# Patient Record
Sex: FEMALE | Employment: OTHER | URBAN - METROPOLITAN AREA
[De-identification: names, ages, dates, MRNs, and addresses within clinical notes are randomized per-mention and may not be internally consistent; named-entity substitution may affect disease eponyms.]

---

## 2024-06-02 ENCOUNTER — APPOINTMENT (OUTPATIENT)
Dept: RADIOLOGY | Facility: MEDICAL CENTER | Age: 89
End: 2024-06-02
Payer: MEDICARE

## 2024-06-02 ENCOUNTER — HOSPITAL ENCOUNTER (INPATIENT)
Facility: MEDICAL CENTER | Age: 89
LOS: 5 days | DRG: 281 | End: 2024-06-07
Attending: STUDENT IN AN ORGANIZED HEALTH CARE EDUCATION/TRAINING PROGRAM | Admitting: STUDENT IN AN ORGANIZED HEALTH CARE EDUCATION/TRAINING PROGRAM
Payer: MEDICARE

## 2024-06-02 DIAGNOSIS — N28.89 RENAL MASS: ICD-10-CM

## 2024-06-02 DIAGNOSIS — E78.5 HYPERLIPIDEMIA, UNSPECIFIED HYPERLIPIDEMIA TYPE: ICD-10-CM

## 2024-06-02 DIAGNOSIS — I35.0 SEVERE AORTIC STENOSIS: ICD-10-CM

## 2024-06-02 DIAGNOSIS — I10 PRIMARY HYPERTENSION: ICD-10-CM

## 2024-06-02 PROBLEM — I21.4 NSTEMI (NON-ST ELEVATED MYOCARDIAL INFARCTION) (HCC): Status: ACTIVE | Noted: 2024-06-02

## 2024-06-02 LAB
ALBUMIN SERPL BCP-MCNC: 3.9 G/DL (ref 3.2–4.9)
ALBUMIN/GLOB SERPL: 1.2 G/DL
ALP SERPL-CCNC: 81 U/L (ref 30–99)
ALT SERPL-CCNC: 14 U/L (ref 2–50)
ANION GAP SERPL CALC-SCNC: 15 MMOL/L (ref 7–16)
APTT PPP: 27.5 SEC (ref 24.7–36)
AST SERPL-CCNC: 23 U/L (ref 12–45)
BASOPHILS # BLD AUTO: 0.4 % (ref 0–1.8)
BASOPHILS # BLD: 0.05 K/UL (ref 0–0.12)
BILIRUB SERPL-MCNC: 1 MG/DL (ref 0.1–1.5)
BUN SERPL-MCNC: 9 MG/DL (ref 8–22)
CALCIUM ALBUM COR SERPL-MCNC: 9.1 MG/DL (ref 8.5–10.5)
CALCIUM SERPL-MCNC: 9 MG/DL (ref 8.5–10.5)
CHLORIDE SERPL-SCNC: 103 MMOL/L (ref 96–112)
CO2 SERPL-SCNC: 21 MMOL/L (ref 20–33)
CREAT SERPL-MCNC: 0.6 MG/DL (ref 0.5–1.4)
EOSINOPHIL # BLD AUTO: 0.03 K/UL (ref 0–0.51)
EOSINOPHIL NFR BLD: 0.3 % (ref 0–6.9)
ERYTHROCYTE [DISTWIDTH] IN BLOOD BY AUTOMATED COUNT: 42 FL (ref 35.9–50)
GFR SERPLBLD CREATININE-BSD FMLA CKD-EPI: 87 ML/MIN/1.73 M 2
GLOBULIN SER CALC-MCNC: 3.2 G/DL (ref 1.9–3.5)
GLUCOSE SERPL-MCNC: 134 MG/DL (ref 65–99)
HCT VFR BLD AUTO: 42.3 % (ref 37–47)
HGB BLD-MCNC: 14 G/DL (ref 12–16)
IMM GRANULOCYTES # BLD AUTO: 0.06 K/UL (ref 0–0.11)
IMM GRANULOCYTES NFR BLD AUTO: 0.5 % (ref 0–0.9)
INR PPP: 1.02 (ref 0.87–1.13)
LYMPHOCYTES # BLD AUTO: 1.24 K/UL (ref 1–4.8)
LYMPHOCYTES NFR BLD: 10.7 % (ref 22–41)
MAGNESIUM SERPL-MCNC: 1.9 MG/DL (ref 1.5–2.5)
MCH RBC QN AUTO: 27.7 PG (ref 27–33)
MCHC RBC AUTO-ENTMCNC: 33.1 G/DL (ref 32.2–35.5)
MCV RBC AUTO: 83.6 FL (ref 81.4–97.8)
MONOCYTES # BLD AUTO: 0.83 K/UL (ref 0–0.85)
MONOCYTES NFR BLD AUTO: 7.2 % (ref 0–13.4)
NEUTROPHILS # BLD AUTO: 9.35 K/UL (ref 1.82–7.42)
NEUTROPHILS NFR BLD: 80.9 % (ref 44–72)
NRBC # BLD AUTO: 0 K/UL
NRBC BLD-RTO: 0 /100 WBC (ref 0–0.2)
NT-PROBNP SERPL IA-MCNC: 6032 PG/ML (ref 0–125)
PLATELET # BLD AUTO: 247 K/UL (ref 164–446)
PMV BLD AUTO: 9 FL (ref 9–12.9)
POTASSIUM SERPL-SCNC: 3.7 MMOL/L (ref 3.6–5.5)
PROT SERPL-MCNC: 7.1 G/DL (ref 6–8.2)
PROTHROMBIN TIME: 13.5 SEC (ref 12–14.6)
RBC # BLD AUTO: 5.06 M/UL (ref 4.2–5.4)
SODIUM SERPL-SCNC: 139 MMOL/L (ref 135–145)
TROPONIN T SERPL-MCNC: 128 NG/L (ref 6–19)
UFH PPP CHRO-ACNC: <0.1 IU/ML
WBC # BLD AUTO: 11.6 K/UL (ref 4.8–10.8)

## 2024-06-02 PROCEDURE — 36415 COLL VENOUS BLD VENIPUNCTURE: CPT

## 2024-06-02 PROCEDURE — 84484 ASSAY OF TROPONIN QUANT: CPT

## 2024-06-02 PROCEDURE — 770020 HCHG ROOM/CARE - TELE (206)

## 2024-06-02 PROCEDURE — 83880 ASSAY OF NATRIURETIC PEPTIDE: CPT

## 2024-06-02 PROCEDURE — 93005 ELECTROCARDIOGRAM TRACING: CPT | Performed by: STUDENT IN AN ORGANIZED HEALTH CARE EDUCATION/TRAINING PROGRAM

## 2024-06-02 PROCEDURE — A9270 NON-COVERED ITEM OR SERVICE: HCPCS | Performed by: STUDENT IN AN ORGANIZED HEALTH CARE EDUCATION/TRAINING PROGRAM

## 2024-06-02 PROCEDURE — 85730 THROMBOPLASTIN TIME PARTIAL: CPT

## 2024-06-02 PROCEDURE — 83735 ASSAY OF MAGNESIUM: CPT

## 2024-06-02 PROCEDURE — 85610 PROTHROMBIN TIME: CPT

## 2024-06-02 PROCEDURE — 700102 HCHG RX REV CODE 250 W/ 637 OVERRIDE(OP): Performed by: STUDENT IN AN ORGANIZED HEALTH CARE EDUCATION/TRAINING PROGRAM

## 2024-06-02 PROCEDURE — 85520 HEPARIN ASSAY: CPT

## 2024-06-02 PROCEDURE — 85025 COMPLETE CBC W/AUTO DIFF WBC: CPT

## 2024-06-02 PROCEDURE — 80053 COMPREHEN METABOLIC PANEL: CPT

## 2024-06-02 PROCEDURE — 99223 1ST HOSP IP/OBS HIGH 75: CPT | Performed by: STUDENT IN AN ORGANIZED HEALTH CARE EDUCATION/TRAINING PROGRAM

## 2024-06-02 PROCEDURE — 700111 HCHG RX REV CODE 636 W/ 250 OVERRIDE (IP): Performed by: STUDENT IN AN ORGANIZED HEALTH CARE EDUCATION/TRAINING PROGRAM

## 2024-06-02 RX ORDER — HEPARIN SODIUM 5000 [USP'U]/100ML
0-30 INJECTION, SOLUTION INTRAVENOUS CONTINUOUS
Status: DISCONTINUED | OUTPATIENT
Start: 2024-06-02 | End: 2024-06-03

## 2024-06-02 RX ORDER — ONDANSETRON 2 MG/ML
4 INJECTION INTRAMUSCULAR; INTRAVENOUS EVERY 4 HOURS PRN
Status: ACTIVE | OUTPATIENT
Start: 2024-06-02 | End: 2024-06-03

## 2024-06-02 RX ORDER — ACETAMINOPHEN 325 MG/1
650 TABLET ORAL EVERY 6 HOURS PRN
Status: ACTIVE | OUTPATIENT
Start: 2024-06-02 | End: 2024-06-03

## 2024-06-02 RX ORDER — RAMIPRIL 5 MG/1
5 CAPSULE ORAL
Status: DISCONTINUED | OUTPATIENT
Start: 2024-06-03 | End: 2024-06-07 | Stop reason: HOSPADM

## 2024-06-02 RX ORDER — ROSUVASTATIN CALCIUM 20 MG/1
20 TABLET, COATED ORAL EVERY EVENING
Status: DISCONTINUED | OUTPATIENT
Start: 2024-06-02 | End: 2024-06-07 | Stop reason: HOSPADM

## 2024-06-02 RX ORDER — LABETALOL HYDROCHLORIDE 5 MG/ML
10 INJECTION, SOLUTION INTRAVENOUS EVERY 4 HOURS PRN
Status: ACTIVE | OUTPATIENT
Start: 2024-06-02 | End: 2024-06-03

## 2024-06-02 RX ORDER — AMLODIPINE BESYLATE 10 MG/1
10 TABLET ORAL
Status: DISCONTINUED | OUTPATIENT
Start: 2024-06-03 | End: 2024-06-02

## 2024-06-02 RX ORDER — HEPARIN SODIUM 1000 [USP'U]/ML
2000 INJECTION, SOLUTION INTRAVENOUS; SUBCUTANEOUS PRN
Status: DISCONTINUED | OUTPATIENT
Start: 2024-06-02 | End: 2024-06-03

## 2024-06-02 RX ORDER — ATORVASTATIN CALCIUM 40 MG/1
40 TABLET, FILM COATED ORAL EVERY EVENING
Status: DISCONTINUED | OUTPATIENT
Start: 2024-06-02 | End: 2024-06-02

## 2024-06-02 RX ADMIN — HEPARIN SODIUM 2000 UNITS: 1000 INJECTION, SOLUTION INTRAVENOUS; SUBCUTANEOUS at 23:56

## 2024-06-02 RX ADMIN — ROSUVASTATIN CALCIUM 20 MG: 20 TABLET, FILM COATED ORAL at 22:56

## 2024-06-02 RX ADMIN — HEPARIN SODIUM 10 UNITS/KG/HR: 5000 INJECTION, SOLUTION INTRAVENOUS at 23:03

## 2024-06-02 SDOH — ECONOMIC STABILITY: TRANSPORTATION INSECURITY
IN THE PAST 12 MONTHS, HAS THE LACK OF TRANSPORTATION KEPT YOU FROM MEDICAL APPOINTMENTS OR FROM GETTING MEDICATIONS?: NO

## 2024-06-02 SDOH — ECONOMIC STABILITY: TRANSPORTATION INSECURITY
IN THE PAST 12 MONTHS, HAS LACK OF RELIABLE TRANSPORTATION KEPT YOU FROM MEDICAL APPOINTMENTS, MEETINGS, WORK OR FROM GETTING THINGS NEEDED FOR DAILY LIVING?: NO

## 2024-06-02 ASSESSMENT — PATIENT HEALTH QUESTIONNAIRE - PHQ9
SUM OF ALL RESPONSES TO PHQ9 QUESTIONS 1 AND 2: 0
1. LITTLE INTEREST OR PLEASURE IN DOING THINGS: NOT AT ALL
2. FEELING DOWN, DEPRESSED, IRRITABLE, OR HOPELESS: NOT AT ALL

## 2024-06-02 ASSESSMENT — SOCIAL DETERMINANTS OF HEALTH (SDOH)
WITHIN THE LAST YEAR, HAVE YOU BEEN AFRAID OF YOUR PARTNER OR EX-PARTNER?: NO
WITHIN THE PAST 12 MONTHS, THE FOOD YOU BOUGHT JUST DIDN'T LAST AND YOU DIDN'T HAVE MONEY TO GET MORE: NEVER TRUE
WITHIN THE LAST YEAR, HAVE TO BEEN RAPED OR FORCED TO HAVE ANY KIND OF SEXUAL ACTIVITY BY YOUR PARTNER OR EX-PARTNER?: NO
WITHIN THE LAST YEAR, HAVE YOU BEEN HUMILIATED OR EMOTIONALLY ABUSED IN OTHER WAYS BY YOUR PARTNER OR EX-PARTNER?: NO
WITHIN THE LAST YEAR, HAVE YOU BEEN KICKED, HIT, SLAPPED, OR OTHERWISE PHYSICALLY HURT BY YOUR PARTNER OR EX-PARTNER?: NO
WITHIN THE PAST 12 MONTHS, YOU WORRIED THAT YOUR FOOD WOULD RUN OUT BEFORE YOU GOT THE MONEY TO BUY MORE: NEVER TRUE
IN THE PAST 12 MONTHS, HAS THE ELECTRIC, GAS, OIL, OR WATER COMPANY THREATENED TO SHUT OFF SERVICE IN YOUR HOME?: NO

## 2024-06-02 ASSESSMENT — ENCOUNTER SYMPTOMS
MYALGIAS: 0
CHILLS: 0
BLURRED VISION: 0
COUGH: 0
VOMITING: 0
DOUBLE VISION: 0
NAUSEA: 0
SORE THROAT: 0
ORTHOPNEA: 0
SPUTUM PRODUCTION: 0
DIZZINESS: 0
HEADACHES: 0
FEVER: 0
NECK PAIN: 0
PALPITATIONS: 0

## 2024-06-02 ASSESSMENT — LIFESTYLE VARIABLES
HOW MANY TIMES IN THE PAST YEAR HAVE YOU HAD 5 OR MORE DRINKS IN A DAY: 0
AVERAGE NUMBER OF DAYS PER WEEK YOU HAVE A DRINK CONTAINING ALCOHOL: 0
TOTAL SCORE: 0
EVER HAD A DRINK FIRST THING IN THE MORNING TO STEADY YOUR NERVES TO GET RID OF A HANGOVER: NO
TOTAL SCORE: 0
DOES PATIENT WANT TO STOP DRINKING: CANNOT ASSESS
HAVE PEOPLE ANNOYED YOU BY CRITICIZING YOUR DRINKING: NO
ALCOHOL_USE: NO
ON A TYPICAL DAY WHEN YOU DRINK ALCOHOL HOW MANY DRINKS DO YOU HAVE: 0
EVER FELT BAD OR GUILTY ABOUT YOUR DRINKING: NO
TOTAL SCORE: 0
HAVE YOU EVER FELT YOU SHOULD CUT DOWN ON YOUR DRINKING: NO
CONSUMPTION TOTAL: NEGATIVE

## 2024-06-03 ENCOUNTER — APPOINTMENT (OUTPATIENT)
Dept: CARDIOLOGY | Facility: MEDICAL CENTER | Age: 89
DRG: 281 | End: 2024-06-03
Attending: PHYSICIAN ASSISTANT
Payer: MEDICARE

## 2024-06-03 LAB
EKG IMPRESSION: NORMAL
LV EJECT FRACT MOD 2C 99903: 67.03
LV EJECT FRACT MOD 4C 99902: 54.48
LV EJECT FRACT MOD BP 99901: 59.5
TROPONIN T SERPL-MCNC: 112 NG/L (ref 6–19)
TROPONIN T SERPL-MCNC: 152 NG/L (ref 6–19)
UFH PPP CHRO-ACNC: 0.6 IU/ML
UFH PPP CHRO-ACNC: 0.82 IU/ML

## 2024-06-03 PROCEDURE — B2111ZZ FLUOROSCOPY OF MULTIPLE CORONARY ARTERIES USING LOW OSMOLAR CONTRAST: ICD-10-PCS | Performed by: INTERNAL MEDICINE

## 2024-06-03 PROCEDURE — 93458 L HRT ARTERY/VENTRICLE ANGIO: CPT | Mod: 26 | Performed by: INTERNAL MEDICINE

## 2024-06-03 PROCEDURE — 85520 HEPARIN ASSAY: CPT

## 2024-06-03 PROCEDURE — 99223 1ST HOSP IP/OBS HIGH 75: CPT | Mod: 25 | Performed by: INTERNAL MEDICINE

## 2024-06-03 PROCEDURE — A9270 NON-COVERED ITEM OR SERVICE: HCPCS | Performed by: STUDENT IN AN ORGANIZED HEALTH CARE EDUCATION/TRAINING PROGRAM

## 2024-06-03 PROCEDURE — 700111 HCHG RX REV CODE 636 W/ 250 OVERRIDE (IP)

## 2024-06-03 PROCEDURE — 36415 COLL VENOUS BLD VENIPUNCTURE: CPT

## 2024-06-03 PROCEDURE — 700111 HCHG RX REV CODE 636 W/ 250 OVERRIDE (IP): Mod: JZ | Performed by: STUDENT IN AN ORGANIZED HEALTH CARE EDUCATION/TRAINING PROGRAM

## 2024-06-03 PROCEDURE — 700117 HCHG RX CONTRAST REV CODE 255: Performed by: INTERNAL MEDICINE

## 2024-06-03 PROCEDURE — 700101 HCHG RX REV CODE 250

## 2024-06-03 PROCEDURE — 99291 CRITICAL CARE FIRST HOUR: CPT | Performed by: STUDENT IN AN ORGANIZED HEALTH CARE EDUCATION/TRAINING PROGRAM

## 2024-06-03 PROCEDURE — 93567 NJX CAR CTH SPRVLV AORTGRPHY: CPT | Performed by: INTERNAL MEDICINE

## 2024-06-03 PROCEDURE — 84484 ASSAY OF TROPONIN QUANT: CPT

## 2024-06-03 PROCEDURE — 93306 TTE W/DOPPLER COMPLETE: CPT | Mod: 26 | Performed by: STUDENT IN AN ORGANIZED HEALTH CARE EDUCATION/TRAINING PROGRAM

## 2024-06-03 PROCEDURE — B3101ZZ FLUOROSCOPY OF THORACIC AORTA USING LOW OSMOLAR CONTRAST: ICD-10-PCS | Performed by: INTERNAL MEDICINE

## 2024-06-03 PROCEDURE — 99152 MOD SED SAME PHYS/QHP 5/>YRS: CPT

## 2024-06-03 PROCEDURE — 93010 ELECTROCARDIOGRAM REPORT: CPT | Performed by: INTERNAL MEDICINE

## 2024-06-03 PROCEDURE — 99152 MOD SED SAME PHYS/QHP 5/>YRS: CPT | Performed by: INTERNAL MEDICINE

## 2024-06-03 PROCEDURE — 700102 HCHG RX REV CODE 250 W/ 637 OVERRIDE(OP): Performed by: STUDENT IN AN ORGANIZED HEALTH CARE EDUCATION/TRAINING PROGRAM

## 2024-06-03 PROCEDURE — 4A023N7 MEASUREMENT OF CARDIAC SAMPLING AND PRESSURE, LEFT HEART, PERCUTANEOUS APPROACH: ICD-10-PCS | Performed by: INTERNAL MEDICINE

## 2024-06-03 PROCEDURE — 770020 HCHG ROOM/CARE - TELE (206)

## 2024-06-03 PROCEDURE — 93306 TTE W/DOPPLER COMPLETE: CPT

## 2024-06-03 PROCEDURE — 94669 MECHANICAL CHEST WALL OSCILL: CPT

## 2024-06-03 RX ORDER — HEPARIN SODIUM 1000 [USP'U]/ML
INJECTION, SOLUTION INTRAVENOUS; SUBCUTANEOUS
Status: COMPLETED
Start: 2024-06-03 | End: 2024-06-03

## 2024-06-03 RX ORDER — LIDOCAINE HYDROCHLORIDE 20 MG/ML
INJECTION, SOLUTION INFILTRATION; PERINEURAL
Status: COMPLETED
Start: 2024-06-03 | End: 2024-06-03

## 2024-06-03 RX ORDER — VERAPAMIL HYDROCHLORIDE 2.5 MG/ML
INJECTION, SOLUTION INTRAVENOUS
Status: COMPLETED
Start: 2024-06-03 | End: 2024-06-03

## 2024-06-03 RX ORDER — MIDAZOLAM HYDROCHLORIDE 1 MG/ML
INJECTION INTRAMUSCULAR; INTRAVENOUS
Status: COMPLETED
Start: 2024-06-03 | End: 2024-06-03

## 2024-06-03 RX ORDER — ENOXAPARIN SODIUM 100 MG/ML
40 INJECTION SUBCUTANEOUS DAILY
Status: DISCONTINUED | OUTPATIENT
Start: 2024-06-03 | End: 2024-06-07 | Stop reason: HOSPADM

## 2024-06-03 RX ORDER — HEPARIN SODIUM 200 [USP'U]/100ML
INJECTION, SOLUTION INTRAVENOUS
Status: COMPLETED
Start: 2024-06-03 | End: 2024-06-03

## 2024-06-03 RX ADMIN — MIDAZOLAM HYDROCHLORIDE 2 MG: 2 INJECTION, SOLUTION INTRAMUSCULAR; INTRAVENOUS at 14:00

## 2024-06-03 RX ADMIN — HEPARIN SODIUM 2000 UNITS: 200 INJECTION, SOLUTION INTRAVENOUS at 13:21

## 2024-06-03 RX ADMIN — NITROGLYCERIN 10 ML: 20 INJECTION INTRAVENOUS at 13:21

## 2024-06-03 RX ADMIN — RAMIPRIL 5 MG: 5 CAPSULE ORAL at 05:03

## 2024-06-03 RX ADMIN — IOHEXOL 95 ML: 350 INJECTION, SOLUTION INTRAVENOUS at 14:00

## 2024-06-03 RX ADMIN — LIDOCAINE HYDROCHLORIDE: 20 INJECTION, SOLUTION INFILTRATION; PERINEURAL at 13:21

## 2024-06-03 RX ADMIN — VERAPAMIL HYDROCHLORIDE 5 MG: 2.5 INJECTION, SOLUTION INTRAVENOUS at 13:21

## 2024-06-03 RX ADMIN — ROSUVASTATIN CALCIUM 20 MG: 20 TABLET, FILM COATED ORAL at 17:21

## 2024-06-03 RX ADMIN — FENTANYL CITRATE 100 MCG: 50 INJECTION, SOLUTION INTRAMUSCULAR; INTRAVENOUS at 14:00

## 2024-06-03 RX ADMIN — ENOXAPARIN SODIUM 40 MG: 100 INJECTION SUBCUTANEOUS at 17:21

## 2024-06-03 RX ADMIN — HEPARIN SODIUM: 1000 INJECTION, SOLUTION INTRAVENOUS; SUBCUTANEOUS at 13:21

## 2024-06-03 ASSESSMENT — PAIN DESCRIPTION - PAIN TYPE
TYPE: ACUTE PAIN

## 2024-06-03 ASSESSMENT — ENCOUNTER SYMPTOMS
NEUROLOGICAL NEGATIVE: 1
NAUSEA: 0
GASTROINTESTINAL NEGATIVE: 1
RESPIRATORY NEGATIVE: 1
HEADACHES: 0
ABDOMINAL PAIN: 0
CHILLS: 0
PSYCHIATRIC NEGATIVE: 1
FEVER: 0
MUSCULOSKELETAL NEGATIVE: 1
MYALGIAS: 0
EYES NEGATIVE: 1
SHORTNESS OF BREATH: 0
VOMITING: 0
CONSTITUTIONAL NEGATIVE: 1
BRUISES/BLEEDS EASILY: 0
PALPITATIONS: 0
COUGH: 0
DIZZINESS: 0
WEAKNESS: 0
NERVOUS/ANXIOUS: 0

## 2024-06-03 ASSESSMENT — COGNITIVE AND FUNCTIONAL STATUS - GENERAL
CLIMB 3 TO 5 STEPS WITH RAILING: A LITTLE
MOBILITY SCORE: 23
DAILY ACTIVITIY SCORE: 24
SUGGESTED CMS G CODE MODIFIER DAILY ACTIVITY: CH
SUGGESTED CMS G CODE MODIFIER MOBILITY: CI

## 2024-06-03 ASSESSMENT — COPD QUESTIONNAIRES
DO YOU EVER COUGH UP ANY MUCUS OR PHLEGM?: NO/ONLY WITH OCCASIONAL COLDS OR INFECTIONS
DURING THE PAST 4 WEEKS HOW MUCH DID YOU FEEL SHORT OF BREATH: SOME OF THE TIME
COPD SCREENING SCORE: 3
HAVE YOU SMOKED AT LEAST 100 CIGARETTES IN YOUR ENTIRE LIFE: NO/DON'T KNOW

## 2024-06-03 ASSESSMENT — FIBROSIS 4 INDEX: FIB4 SCORE: 2.14

## 2024-06-03 NOTE — PROGRESS NOTES
[facility] Medicine Daily Progress Note    Date of Service  6/3/2024    Chief Complaint  Kelly Ly is a 88 y.o. female admitted 6/2/2024 with NSTEMI    Hospital Course  Kelly Ly is a 88 y.o. female who presented 6/2/2024 with possible NSTEMI.  Very pleasant woman with history of hypertension and hyperlipidemia, otherwise healthy and active.  She presented to her local ED in [place] due to chest pain and shortness of breath.  She presented to outside ED.  EKG showed sinus rhythm with some ST depressions diffusely however wandering baseline, rate 90, QTc 512.  Troponin was elevated at 953, with upper limit of normal at 45.  Chest x-ray showed diffusely increased interstitial markings with mildly enlarged heart, calcified aorta.  She was transferred here for higher level of care due to NSTEMI.     Interval Problem Update  I saw and examined the patient at bedside  She denies current chest pain, no nausea vomiting    Troponin 128 > 152  Continue heparin drip  I discussed with cardiology, plan for [facility] today    proBNP 6032  Echo pending    I have discussed this patient's plan of care and discharge plan at IDT rounds today with Case Management, Nursing, Nursing leadership, and other members of the IDT team.    Consultants/Specialty  cardiology    Code Status  Full Code    Disposition  The patient is not medically cleared for discharge to home or a post-acute facility.      I have placed the appropriate orders for post-discharge needs.    Review of Systems  All 12 systems were reviewed and negative except as mentioned above       Physical Exam  Temp:  [36.5 °C (97.7 °F)-37 °C (98.6 °F)] 37 °C (98.6 °F)  Pulse:  [75-93] 93  Resp:  [18-24] 18  BP: (108-163)/(67-84) 163/82  SpO2:  [93 %-97 %] 93 %    Physical Exam  Constitutional:       Appearance: She is ill-appearing.   HENT:      Head: Normocephalic.      Nose: Nose normal.      Mouth/Throat:      Mouth: Mucous membranes are moist.   Eyes:      Extraocular  Movements: Extraocular movements intact.      Conjunctiva/sclera: Conjunctivae normal.   Cardiovascular:      Rate and Rhythm: Normal rate and regular rhythm.      Pulses: Normal pulses.      Heart sounds: Murmur heard.   Pulmonary:      Effort: Pulmonary effort is normal.      Breath sounds: Normal breath sounds. No wheezing or rales.   Abdominal:      General: Bowel sounds are normal.      Palpations: Abdomen is soft.      Tenderness: There is no abdominal tenderness. There is no guarding.   Musculoskeletal:         General: No swelling or tenderness.      Cervical back: Normal range of motion.   Skin:     General: Skin is warm.   Neurological:      Mental Status: She is alert and oriented to person, place, and time. Mental status is at baseline.   Psychiatric:         Mood and Affect: Mood normal.         Fluids    Intake/Output Summary (Last 24 hours) at 6/3/2024 1327  Last data filed at 6/3/2024 0655  Gross per 24 hour   Intake 40 ml   Output 650 ml   Net -610 ml       Laboratory  Recent Labs     06/02/24  2313   WBC 11.6*   RBC 5.06   HEMOGLOBIN 14.0   HEMATOCRIT 42.3   MCV 83.6   MCH 27.7   MCHC 33.1   RDW 42.0   PLATELETCT 247   MPV 9.0     Recent Labs     06/02/24  2313   SODIUM 139   POTASSIUM 3.7   CHLORIDE 103   CO2 21   GLUCOSE 134*   BUN 9   CREATININE 0.60   CALCIUM 9.0     Recent Labs     06/02/24  2313   APTT 27.5   INR 1.02               Imaging  EC-ECHOCARDIOGRAM COMPLETE W/O CONT   Final Result      OUTSIDE IMAGES-DX CHEST   Final Result      OUTSIDE IMAGES-DX CHEST   Final Result      CL-LEFT HEART CATHETERIZATION WITH POSSIBLE INTERVENTION    (Results Pending)        Assessment/Plan  * NSTEMI (non-ST elevated myocardial infarction) (HCC)- (present on admission)  Assessment & Plan  Troponin 953 at outside hospital where upper limit of normal is 45.  Started on heparin drip prior to transfer.    Troponin 128 > 152  Continue heparin drip, monitor PT PTT Hb and bleeding signs  I discussed with  cardiology, plan for [facility] today  Continue telemetry monitor    Hyperlipidemia  Assessment & Plan  Continue rosuvastatin    Primary hypertension  Assessment & Plan  Continue amlodipine  Awaiting labs in house         VTE prophylaxis: Heparin drip    I have performed a physical exam and reviewed and updated ROS and Plan today (6/3/2024). In review of yesterday's note (6/2/2024), there are no changes except as documented above.    Non-STEMI requires heparin drip.  Needs close monitor of PT, PTT, Hb and bleeding signs.   The very real possibility of a deterioration of this patient’s condition required the highest level of my preparedness for sudden, emergent intervention. I provided critical care services, which included medication orders, frequent reevaluations of the patient’s condition and response to treatment, ordering and reviewing test results, and discussing the case with various consultants. The critical care time associated with the care of the patient was 35 minutes. Review chart for interventions. This time is exclusive of any other billable procedures.

## 2024-06-03 NOTE — PROGRESS NOTES
Pt returned from cath lab with Riya ANDUJAR. Vitals stable. 15ml in R TR band. Per RN, doctor wants heparin drip discontinued. Cardiac diet ordered.

## 2024-06-03 NOTE — PROGRESS NOTES
[facility] TRIAGE OFFICER CONSULT REQUEST REPORT  Consult requested by: Dr Villatoro of [facility]  Reason for consult: Chest pain  Is consult for transition of care: Yes  Pertinent history/hospital Course: 88-year-old female reporting to their facility complaining of chest pain with shortness of breath and is diagnosed with congestive heart failure and non-ST elevation myocardial infarction with a troponin of 900.  Patient will need cardiology consultation evaluation and will need cardiac management and thus will be admitted to telemetry at [facility].  Code Status: Full  Can the hospitalist sign off upon completion of required consult/outcomes requested: No   Assigned hospitalist: Please call [phone] upon arrival for distribution to hospitalist    For any question or concerns regarding the care of this patient, please reach out to the assigned hospitalist.

## 2024-06-03 NOTE — PROGRESS NOTES
4 Eyes Skin Assessment Completed by CON Rodriguez and FREDDIE Sharma.    Head WDL  Ears Redness and Blanching  Nose WDL  Mouth WDL  Neck WDL  Breast/Chest WDL  Shoulder Blades WDL  Spine WDL  (R) Arm/Elbow/Hand Bruising and Swelling  (L) Arm/Elbow/Hand Bruising and Swelling  Abdomen WDL  Groin WDL  Scrotum/Coccyx/Buttocks WDL  (R) Leg Redness, Rash, and Edema  (L) Leg Redness, Rash, and Edema  (R) Heel/Foot/Toe Redness, Blanching, and Boggy  (L) Heel/Foot/Toe Redness, Blanching, and Boggy          Devices In Places Tele Box, Blood Pressure Cuff, Pulse Ox, and Nasal Cannula      Interventions In Place Gray Ear Foams, Pillows, Low Air Loss Mattress, Barrier Cream, and Heels Loaded W/Pillows    Possible Skin Injury No    Pictures Uploaded Into Epic Yes  Wound Consult Placed N/A  RN Wound Prevention Protocol Ordered Yes

## 2024-06-03 NOTE — PROGRESS NOTES
Monitor Summary:   Rhythm: sr  Rate: 75-80  Measurement: .18/.12/.43  Ectopy: pvc's, rare couplets trigeminy and bigeminy

## 2024-06-03 NOTE — CARE PLAN
Problem: Knowledge Deficit - Standard  Goal: Patient and family/care givers will demonstrate understanding of plan of care, disease process/condition, diagnostic tests and medications  6/3/2024 0043 by Michael Hurley R.N.  Outcome: Progressing  6/3/2024 0043 by Michael Hurley R.N.  Outcome: Progressing     Problem: Hemodynamics  Goal: Patient's hemodynamics, fluid balance and neurologic status will be stable or improve  Outcome: Progressing   The patient is Watcher - Medium risk of patient condition declining or worsening    Shift Goals  Clinical Goals: new admit, heparin gtt    Progress made toward(s) clinical / shift goals:  progressing    Patient is not progressing towards the following goals:

## 2024-06-03 NOTE — CONSULTS
Cardiology Initial Consultation    Date of Service  6/3/2024    Referring Physician  Karla Macias M.D.    Reason for Consultation  NSTEMI, severe aortic stenosis, history of hypertension and hyperlipidemia.     History of Presenting Illness  Kelly Ly is a 88 y.o. female with a past medical history of hypertension and hyperlipidemia, lifelong nonsmoker, unknown family history of coronary artery disease who presented 6/2/2024, as a transfer from [facility] for NSTEMI.    Review of Systems  Review of Systems   Constitutional: Negative.  Negative for chills and fever.   HENT: Negative.  Negative for hearing loss.    Eyes: Negative.    Respiratory: Negative.  Negative for cough and shortness of breath.    Cardiovascular:  Positive for chest pain. Negative for palpitations and leg swelling.   Gastrointestinal: Negative.  Negative for abdominal pain, nausea and vomiting.   Genitourinary: Negative.  Negative for dysuria and urgency.   Musculoskeletal: Negative.  Negative for myalgias.   Skin: Negative.  Negative for rash.   Neurological: Negative.  Negative for dizziness, weakness and headaches.   Hematological:  Does not bruise/bleed easily.   Psychiatric/Behavioral: Negative.  The patient is not nervous/anxious.        Past Medical History   has no past medical history on file.    Surgical History   has no past surgical history on file.    Family History  family history is not on file.    Social History       Medications  None       Allergies  Allergies   Allergen Reactions    Codeine        Vital signs in last 24 hours  Temp:  [36.5 °C (97.7 °F)-36.8 °C (98.2 °F)] 36.7 °C (98.1 °F)  Pulse:  [75-89] 75  Resp:  [18-24] 18  BP: (108-158)/(67-84) 158/67  SpO2:  [95 %-97 %] 96 %    Physical Exam  Physical Exam  Constitutional:       Appearance: Normal appearance. She is well-developed and normal weight.   HENT:      Head: Normocephalic and atraumatic.      Mouth/Throat:      Mouth: Mucous membranes are moist.   Eyes:       Extraocular Movements: Extraocular movements intact.      Conjunctiva/sclera: Conjunctivae normal.   Cardiovascular:      Rate and Rhythm: Normal rate and regular rhythm.      Pulses: Normal pulses.      Heart sounds: Murmur heard.   Pulmonary:      Effort: Pulmonary effort is normal.      Breath sounds: Normal breath sounds.   Abdominal:      General: Bowel sounds are normal.      Palpations: Abdomen is soft.   Musculoskeletal:         General: Normal range of motion.      Cervical back: Normal range of motion and neck supple.   Skin:     General: Skin is warm and dry.   Neurological:      General: No focal deficit present.      Mental Status: She is alert and oriented to person, place, and time. Mental status is at baseline.   Psychiatric:         Mood and Affect: Mood normal.         Behavior: Behavior normal.         Thought Content: Thought content normal.         Judgment: Judgment normal.         Lab Review  Lab Results   Component Value Date/Time    WBC 11.6 (H) 06/02/2024 11:13 PM    RBC 5.06 06/02/2024 11:13 PM    HEMOGLOBIN 14.0 06/02/2024 11:13 PM    HEMATOCRIT 42.3 06/02/2024 11:13 PM    MCV 83.6 06/02/2024 11:13 PM    MCH 27.7 06/02/2024 11:13 PM    MCHC 33.1 06/02/2024 11:13 PM    MPV 9.0 06/02/2024 11:13 PM      Lab Results   Component Value Date/Time    SODIUM 139 06/02/2024 11:13 PM    POTASSIUM 3.7 06/02/2024 11:13 PM    CHLORIDE 103 06/02/2024 11:13 PM    CO2 21 06/02/2024 11:13 PM    GLUCOSE 134 (H) 06/02/2024 11:13 PM    BUN 9 06/02/2024 11:13 PM    CREATININE 0.60 06/02/2024 11:13 PM      Lab Results   Component Value Date/Time    ASTSGOT 23 06/02/2024 11:13 PM    ALTSGPT 14 06/02/2024 11:13 PM     Lab Results   Component Value Date/Time    TROPONINT 128 (H) 06/02/2024 11:13 PM       Recent Labs     06/02/24  2313   NTPROBNP 6032*       Cardiac Imaging and Procedures Review  CARDIAC STUDIES/PROCEDURES:    EKG performed on (06/02/24) was reviewed: EKG personally interpreted shows sinus rhythm  with right bundle branch block.     Assessment/Plan  NSTEMI: She is a 88 y.o. female with a past medical history of hypertension and hyperlipidemia, lifelong nonsmoker, unknown family history of coronary artery disease who presented as a transfer from [facility] for NSTEMI.We will perform cardiac catheterization. She understands the risks and benefits and agrees with plan.    Additional information:   ECHOCARDIOGRAM CONCLUSIONS (06/03/24)  Normal left ventricular systolic function.    The ejection fraction is measured to be 59% by Mullen's biplane.   Mild concentric left ventricular hypertrophy. Grade I diastolic dysfunction.  The right ventricle is normal in size and systolic function.  Mild mitral regurgitation.  Severe aortic valve stenosis.   Vmax is 4.6 m/s. Transvalvular gradients are - Peak: 85 mmHg, Mean: 48 mmHg.  Unable to estimate right ventricular systolic pressure due to an   inadequate tricuspid regurgitant jet.  No prior study is available for comparison.   Primary team is notified of findings.  (study result reviewed)     2.   Severe aortic stenosis: Her aortic stenosis is severe status. She will be referred to structural heart team for TAVR.     Thank you for allowing me to participate in the care of this patient.    I will continue to follow this patient    Please contact me with any questions.    Jarrett Sweet M.D.   Cardiologist, [facility]  [phone]

## 2024-06-03 NOTE — CARE PLAN
Problem: Hyperinflation  Goal: Prevent or improve atelectasis  Description: Target End Date:  3 to 4 days    1. Instruct incentive spirometry usage  2.  Perform hyperinflation therapy as indicated  Outcome: Progressing   PEP QID IS 1000

## 2024-06-03 NOTE — CARE PLAN
Problem: Knowledge Deficit - Standard  Goal: Patient and family/care givers will demonstrate understanding of plan of care, disease process/condition, diagnostic tests and medications  Description: Target End Date:  1-3 days or as soon as patient condition allows    Document in Patient Education    1.  Patient and family/caregiver oriented to unit, equipment, visitation policy and means for communicating concern  2.  Complete/review Learning Assessment  3.  Assess knowledge level of disease process/condition, treatment plan, diagnostic tests and medications  4.  Explain disease process/condition, treatment plan, diagnostic tests and medications  Outcome: Progressing     Problem: Hemodynamics  Goal: Patient's hemodynamics, fluid balance and neurologic status will be stable or improve  Description: Target End Date:  Prior to discharge or change in level of care    Document on Assessment and I/O flowsheet templates    1.  Monitor vital signs, pulse oximetry and cardiac monitor per provider order and/or policy  2.  Maintain blood pressure per provider order  3.  Hemodynamic monitoring per provider order  4.  Manage IV fluids and IV infusions  5.  Monitor intake and output  6.  Daily weights per unit policy or provider order  7.  Assess peripheral pulses and capillary refill  8.  Assess color and body temperature  9.  Position patient for maximum circulation/cardiac output  10. Monitor for signs/symptoms of excessive bleeding  11. Assess mental status, restlessness and changes in level of consciousness  12. Monitor temperature and report fever or hypothermia to provider immediately. Consideration of targeted temperature management.  Outcome: Progressing   The patient is Watcher - Medium risk of patient condition declining or worsening    Shift Goals  Clinical Goals: monitor hep gtt/monitor for cp    Progress made toward(s) clinical / shift goals:  Pt updated on POC    Patient is not progressing towards the following  goals:

## 2024-06-03 NOTE — PROGRESS NOTES
[facility]. VSS. Pt oriented to room. Educated on use of the call light. Pt demonstrated use of the call light. Discussed POC. All questions answered.

## 2024-06-03 NOTE — PROCEDURES
Cardiac Catheterization Laboratory Procedure Note    DATE: 6/3/2024    : Den Quintana MD    PROCEDURES PERFORMED:  Left heart catheterization  Coronary angiography  Ascending aortography  Moderate conscious sedation    INDICATIONS:  The patient is an 88-year-old woman referred for cardiac catheterization to evaluate chest pressure symptoms with a mildly elevated troponin and evidence of severe aortic stenosis on echocardiogram.    CONSENT:  The complete alternatives, risks, and benefits of the procedure were explained to the patient.  Signed informed consent was obtained and placed in the chart prior to the procedure.  A timeout was performed prior to beginning the procedure.    MEDICATIONS:  Lidocaine  Fentanyl  Midazolam  Nitroglycerin  Verapamil  Heparin    MODERATE CONSCIOUS SEDATION:  I personally supervised the administration of moderate conscious sedation by the nursing staff for 33 minutes.  Sedation start time: 1:27 PM  Sedation end time: 2:00 PM    CONTRAST: Omnipaque 95 cc    ACCESS: 6-Colombian Glidesheath in the right radial artery.    ESTIMATED BLOOD LOSS: 20 cc    COMPLICATIONS: None    DESCRIPTION OF PROCEDURE:  The patient was brought to the cardiac catheterization laboratory in the fasting state.  The skin over the right wrist was prepped and draped in the usual sterile fashion.  Lidocaine infiltration was used to anesthetize the tissue over the right radial artery.  Using the micropuncture technique, a 6-Colombian Glidesheath was inserted in the right radial artery.  A 5-Colombian Brady catheter was then advanced over a standard J-wire into the left aortic root.  This catheter was unable to engage either coronary ostia and it was exchanged for a 6-Colombian JR-4 diagnostic catheter.  This catheter could not engage the ostium of the right coronary artery, however, a straight wire was able to be advanced into the left ventricular cavity where the catheter was gently aspirated, flushed, and withdrawn  across the aortic valve with sequential pressures measured.  This catheter was then exchanged for a 6-Georgian JL-3.5 and then a 6-Georgian JL-4 diagnostic catheter, which was used to engage the ostium of the left main coronary artery and cineangiograms were obtained in multiple projections for complete evaluation of the left coronary system.  Notably, during left coronary system injection, there was approximately 20 mmHg dampening of the wave pressure with development of diffuse 3-4 mm ST depressions.  As the patient also had evidence of underlying severe aortic stenosis, further invasive assessment of the distal left main coronary artery was not performed due to the risk of potential decompensation.  This catheter was then exchanged for a 6-Georgian AR-mod diagnostic catheter, which was used to engage the ostium of the right coronary artery and cineangiograms were obtained in multiple projections for complete evaluation of the right coronary system.  Following completion of coronary angiography, the AR catheter was exchanged for a 6-Georgian pigtail catheter, which was used to perform an ascending aortogram for procedural planning purposes.  At the completion of the case all wires, catheters, and sheaths were removed.  A TR band was placed using the patent hemostasis technique.    HEMODYNAMICS:   Left ventricular pressure: 199/16 mmHg  Aortic pressure: 128/51 mmHg    CORONARY ANGIOGRAPHY:  The left main coronary artery appears to be diffusely narrowed with proximal 40% disease and distal 50% disease.  The left anterior descending coronary artery is a small and diffusely narrowed vessel without discrete significant stenosis.  It supplies two small and diffusely narrowed diagonal branches.  The left circumflex coronary artery is a small diffusely narrowed vessel without discrete significant stenosis.  It supplies one significant, branching obtuse marginal branch that is small and diffusely narrowed with discrete proximal  30% disease.  The right coronary artery is a moderate, dominant vessel with focal mid 50% disease and otherwise diffuse luminal irregularities.  It supplies a small caliber posterior descending coronary and a small caliber posterolateral branch.    ASCENDING AORTOGRAPHY:  Normal ascending aorta diameter at 2.8 cm.  No significant aortic regurgitation.    IMPRESSION:  Relatively small and diffusely narrowed coronary vessels.  Approximately 50% distal left main coronary artery disease of unclear hemodynamic significance.  Mildly elevated left heart filling pressures.  Approximately 71 mmHg peak-to-peak transaortic gradient.  Normal ascending aorta diameter.    RECOMMENDATIONS:  Return to floor with continued care per primary service.  TR band release per protocol.  Continue optimal cardiovascular risk factor management.  Proceed with evaluation for aortic valve replacement if indicated.  Consider noninvasive stress testing if more definitive assessment of distal left main coronary disease is necessary prior to aortic valve replacement.

## 2024-06-03 NOTE — H&P
Hospital Medicine History & Physical Note    Date of Service  6/2/2024    Primary Care Physician  No primary care provider on file.    Code Status  Full Code    Chief Complaint  No chief complaint on file.      History of Presenting Illness  Kelly Ly is a 88 y.o. female who presented 6/2/2024 with possible NSTEMI.  Very pleasant woman with history of hypertension and hyperlipidemia, otherwise healthy and active.  She presented to her local ED in [place] due to chest pain and shortness of breath.  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  She presented to outside ED.  EKG showed sinus rhythm with some ST depressions diffusely however wandering baseline, rate 90, QTc 512.  Troponin was elevated at 953, with upper limit of normal at 45.  Otherwise chemistry largely normal, WBC 10.9, hemoglobin 13.5.  Chest x-ray showed diffusely increased interstitial markings with mildly enlarged heart, calcified aorta.  She was transferred here for higher level of care due to NSTEMI.    I discussed the plan of care with patient.    Review of Systems  Review of Systems   Constitutional:  Negative for chills and fever.   HENT:  Negative for congestion and sore throat.    Eyes:  Negative for blurred vision and double vision.   Respiratory:  Negative for cough and sputum production.    Cardiovascular:  Negative for palpitations and orthopnea.   Gastrointestinal:  Negative for nausea and vomiting.   Genitourinary:  Negative for dysuria and urgency.   Musculoskeletal:  Negative for myalgias and neck pain.   Neurological:  Negative for dizziness and headaches.       Past Medical History   has no past medical history on file.    Surgical History   has no past surgical history on file.     Family  "History  family history is not on file.   Family history reviewed with patient. There is no family history that is pertinent to the chief complaint.     Social History       Allergies  Not on File    Medications  None       Physical Exam  Temp:  [36.8 °C (98.2 °F)] 36.8 °C (98.2 °F)  Pulse:  [89] 89  Resp:  [24] 24  BP: (141)/(71) 141/71  SpO2:  [97 %] 97 %  Blood Pressure : (!) 141/71   Temperature: 36.8 °C (98.2 °F)   Pulse: 89   Respiration: (!) 24   Pulse Oximetry: 97 %       Physical Exam  Constitutional:       General: She is not in acute distress.     Appearance: She is not toxic-appearing.   HENT:      Head: Normocephalic and atraumatic.      Nose: Nose normal.      Mouth/Throat:      Mouth: Mucous membranes are moist.      Pharynx: Oropharynx is clear.   Eyes:      Extraocular Movements: Extraocular movements intact.      Conjunctiva/sclera: Conjunctivae normal.   Cardiovascular:      Rate and Rhythm: Normal rate and regular rhythm.      Pulses: Normal pulses.      Heart sounds: Murmur heard.   Pulmonary:      Effort: Pulmonary effort is normal.      Breath sounds: Normal breath sounds.   Abdominal:      Palpations: Abdomen is soft.      Tenderness: There is no abdominal tenderness.   Musculoskeletal:      Cervical back: Neck supple. No rigidity.      Comments: 1+ pedal edema   Skin:     General: Skin is warm and dry.   Neurological:      General: No focal deficit present.      Mental Status: She is oriented to person, place, and time.         Laboratory:          No results for input(s): \"ALTSGPT\", \"ASTSGOT\", \"ALKPHOSPHAT\", \"TBILIRUBIN\", \"DBILIRUBIN\", \"GAMMAGT\", \"AMYLASE\", \"LIPASE\", \"ALB\", \"PREALBUMIN\", \"GLUCOSE\" in the last 72 hours.      No results for input(s): \"NTPROBNP\" in the last 72 hours.      No results for input(s): \"TROPONINT\" in the last 72 hours.    Imaging:  OUTSIDE IMAGES-DX CHEST   Final Result      OUTSIDE IMAGES-DX CHEST   Final Result      EC-ECHOCARDIOGRAM COMPLETE W/O CONT    (Results " Pending)       EKG:  I have personally reviewed the images and compared with prior images. and My impression is: EKG showed sinus rhythm with some ST depressions diffusely however wandering baseline, rate 90, QTc 512.    Assessment/Plan:  Justification for Admission Status  I anticipate this patient will require at least two midnights for appropriate medical management, necessitating inpatient admission because NSTEMI    Patient will need a Telemetry bed on MEDICAL service .  The need is secondary to above.    * NSTEMI (non-ST elevated myocardial infarction) (HCC)- (present on admission)  Assessment & Plan  Troponin 953 at outside hospital where upper limit of normal is 45.  Started on heparin drip prior to transfer.  Getting EKG in house  Getting labs in house including troponin and BNP  Echocardiogram  Heparin GTT  High intensity statin  Currently chest pain-free, EKG with chest pain, nitro      Hyperlipidemia  Assessment & Plan  Continue rosuvastatin    Primary hypertension  Assessment & Plan  Continue amlodipine  Awaiting labs in house        VTE prophylaxis: SCDs/TEDs and therapeutic anticoagulation with heparin

## 2024-06-03 NOTE — ASSESSMENT & PLAN NOTE
Troponin 953 at outside hospital where upper limit of normal is 45.  Started on heparin drip prior to transfer.    Troponin 128 > 152  S/p [facility] 6/3 Relatively small and diffusely narrowed coronary vessels. Approximately 50% distal left main coronary artery disease of unclear hemodynamic significance. 71 mmHg peak-to-peak transaortic gradient   Echo notes severe AS

## 2024-06-04 ENCOUNTER — APPOINTMENT (OUTPATIENT)
Dept: RADIOLOGY | Facility: MEDICAL CENTER | Age: 89
DRG: 281 | End: 2024-06-04
Attending: NURSE PRACTITIONER
Payer: MEDICARE

## 2024-06-04 ENCOUNTER — APPOINTMENT (OUTPATIENT)
Dept: RADIOLOGY | Facility: MEDICAL CENTER | Age: 89
DRG: 281 | End: 2024-06-04
Payer: MEDICARE

## 2024-06-04 PROBLEM — I35.0 SEVERE AORTIC STENOSIS: Status: ACTIVE | Noted: 2024-06-04

## 2024-06-04 LAB
ALBUMIN SERPL BCP-MCNC: 3.3 G/DL (ref 3.2–4.9)
ALBUMIN/GLOB SERPL: 1.1 G/DL
ALP SERPL-CCNC: 74 U/L (ref 30–99)
ALT SERPL-CCNC: 12 U/L (ref 2–50)
ANION GAP SERPL CALC-SCNC: 13 MMOL/L (ref 7–16)
AST SERPL-CCNC: 23 U/L (ref 12–45)
BASE EXCESS BLDV CALC-SCNC: 0 MMOL/L
BASOPHILS # BLD AUTO: 0.5 % (ref 0–1.8)
BASOPHILS # BLD: 0.05 K/UL (ref 0–0.12)
BILIRUB SERPL-MCNC: 0.8 MG/DL (ref 0.1–1.5)
BODY TEMPERATURE: 37 CENTIGRADE
BUN SERPL-MCNC: 8 MG/DL (ref 8–22)
CALCIUM ALBUM COR SERPL-MCNC: 9.1 MG/DL (ref 8.5–10.5)
CALCIUM SERPL-MCNC: 8.5 MG/DL (ref 8.5–10.5)
CHLORIDE SERPL-SCNC: 105 MMOL/L (ref 96–112)
CO2 SERPL-SCNC: 21 MMOL/L (ref 20–33)
CREAT SERPL-MCNC: 0.56 MG/DL (ref 0.5–1.4)
D DIMER PPP IA.FEU-MCNC: 0.9 UG/ML (FEU) (ref 0–0.5)
EKG IMPRESSION: NORMAL
EOSINOPHIL # BLD AUTO: 0.2 K/UL (ref 0–0.51)
EOSINOPHIL NFR BLD: 1.9 % (ref 0–6.9)
ERYTHROCYTE [DISTWIDTH] IN BLOOD BY AUTOMATED COUNT: 42.5 FL (ref 35.9–50)
GFR SERPLBLD CREATININE-BSD FMLA CKD-EPI: 88 ML/MIN/1.73 M 2
GLOBULIN SER CALC-MCNC: 3 G/DL (ref 1.9–3.5)
GLUCOSE BLD STRIP.AUTO-MCNC: 111 MG/DL (ref 65–99)
GLUCOSE SERPL-MCNC: 113 MG/DL (ref 65–99)
HCO3 BLDV-SCNC: 24 MMOL/L (ref 24–28)
HCT VFR BLD AUTO: 41.3 % (ref 37–47)
HGB BLD-MCNC: 13.5 G/DL (ref 12–16)
IMM GRANULOCYTES # BLD AUTO: 0.04 K/UL (ref 0–0.11)
IMM GRANULOCYTES NFR BLD AUTO: 0.4 % (ref 0–0.9)
INHALED O2 FLOW RATE: 2.5 L/MIN
INR PPP: 0.99 (ref 0.87–1.13)
LACTATE SERPL-SCNC: 1 MMOL/L (ref 0.5–2)
LYMPHOCYTES # BLD AUTO: 1.72 K/UL (ref 1–4.8)
LYMPHOCYTES NFR BLD: 16.8 % (ref 22–41)
MCH RBC QN AUTO: 27.8 PG (ref 27–33)
MCHC RBC AUTO-ENTMCNC: 32.7 G/DL (ref 32.2–35.5)
MCV RBC AUTO: 85 FL (ref 81.4–97.8)
MONOCYTES # BLD AUTO: 0.95 K/UL (ref 0–0.85)
MONOCYTES NFR BLD AUTO: 9.3 % (ref 0–13.4)
NEUTROPHILS # BLD AUTO: 7.3 K/UL (ref 1.82–7.42)
NEUTROPHILS NFR BLD: 71.1 % (ref 44–72)
NRBC # BLD AUTO: 0 K/UL
NRBC BLD-RTO: 0 /100 WBC (ref 0–0.2)
NT-PROBNP SERPL IA-MCNC: 6206 PG/ML (ref 0–125)
PCO2 BLDV: 38.3 MMHG (ref 41–51)
PCO2 TEMP ADJ BLDV: 38.3 MMHG (ref 41–51)
PH BLDV: 7.42 [PH] (ref 7.31–7.45)
PH TEMP ADJ BLDV: 7.42 [PH] (ref 7.31–7.45)
PLATELET # BLD AUTO: 263 K/UL (ref 164–446)
PMV BLD AUTO: 9.4 FL (ref 9–12.9)
PO2 BLDV: 43.2 MMHG (ref 25–40)
PO2 TEMP ADJ BLDV: 43.2 MMHG (ref 25–40)
POTASSIUM SERPL-SCNC: 3.7 MMOL/L (ref 3.6–5.5)
PROT SERPL-MCNC: 6.3 G/DL (ref 6–8.2)
PROTHROMBIN TIME: 13.2 SEC (ref 12–14.6)
RBC # BLD AUTO: 4.86 M/UL (ref 4.2–5.4)
SAO2 % BLDV: 79.8 %
SODIUM SERPL-SCNC: 139 MMOL/L (ref 135–145)
TROPONIN T SERPL-MCNC: 169 NG/L (ref 6–19)
WBC # BLD AUTO: 10.3 K/UL (ref 4.8–10.8)

## 2024-06-04 PROCEDURE — 71275 CT ANGIOGRAPHY CHEST: CPT

## 2024-06-04 PROCEDURE — 83605 ASSAY OF LACTIC ACID: CPT

## 2024-06-04 PROCEDURE — 84484 ASSAY OF TROPONIN QUANT: CPT

## 2024-06-04 PROCEDURE — 700117 HCHG RX CONTRAST REV CODE 255: Performed by: NURSE PRACTITIONER

## 2024-06-04 PROCEDURE — 770020 HCHG ROOM/CARE - TELE (206)

## 2024-06-04 PROCEDURE — 700102 HCHG RX REV CODE 250 W/ 637 OVERRIDE(OP): Performed by: STUDENT IN AN ORGANIZED HEALTH CARE EDUCATION/TRAINING PROGRAM

## 2024-06-04 PROCEDURE — 85610 PROTHROMBIN TIME: CPT

## 2024-06-04 PROCEDURE — 82803 BLOOD GASES ANY COMBINATION: CPT

## 2024-06-04 PROCEDURE — 700111 HCHG RX REV CODE 636 W/ 250 OVERRIDE (IP): Mod: JZ | Performed by: STUDENT IN AN ORGANIZED HEALTH CARE EDUCATION/TRAINING PROGRAM

## 2024-06-04 PROCEDURE — 83880 ASSAY OF NATRIURETIC PEPTIDE: CPT

## 2024-06-04 PROCEDURE — 71045 X-RAY EXAM CHEST 1 VIEW: CPT

## 2024-06-04 PROCEDURE — 82962 GLUCOSE BLOOD TEST: CPT

## 2024-06-04 PROCEDURE — 93005 ELECTROCARDIOGRAM TRACING: CPT

## 2024-06-04 PROCEDURE — 99233 SBSQ HOSP IP/OBS HIGH 50: CPT | Performed by: STUDENT IN AN ORGANIZED HEALTH CARE EDUCATION/TRAINING PROGRAM

## 2024-06-04 PROCEDURE — 80053 COMPREHEN METABOLIC PANEL: CPT

## 2024-06-04 PROCEDURE — A9270 NON-COVERED ITEM OR SERVICE: HCPCS | Performed by: STUDENT IN AN ORGANIZED HEALTH CARE EDUCATION/TRAINING PROGRAM

## 2024-06-04 PROCEDURE — 93010 ELECTROCARDIOGRAM REPORT: CPT | Performed by: STUDENT IN AN ORGANIZED HEALTH CARE EDUCATION/TRAINING PROGRAM

## 2024-06-04 PROCEDURE — 85379 FIBRIN DEGRADATION QUANT: CPT

## 2024-06-04 PROCEDURE — 85025 COMPLETE CBC W/AUTO DIFF WBC: CPT

## 2024-06-04 PROCEDURE — 700111 HCHG RX REV CODE 636 W/ 250 OVERRIDE (IP): Mod: JZ

## 2024-06-04 PROCEDURE — 99232 SBSQ HOSP IP/OBS MODERATE 35: CPT | Performed by: INTERNAL MEDICINE

## 2024-06-04 PROCEDURE — 36415 COLL VENOUS BLD VENIPUNCTURE: CPT

## 2024-06-04 RX ORDER — ONDANSETRON 2 MG/ML
4 INJECTION INTRAMUSCULAR; INTRAVENOUS EVERY 4 HOURS PRN
Status: DISCONTINUED | OUTPATIENT
Start: 2024-06-04 | End: 2024-06-07 | Stop reason: HOSPADM

## 2024-06-04 RX ADMIN — ONDANSETRON 4 MG: 2 INJECTION INTRAMUSCULAR; INTRAVENOUS at 02:50

## 2024-06-04 RX ADMIN — ENOXAPARIN SODIUM 40 MG: 100 INJECTION SUBCUTANEOUS at 16:51

## 2024-06-04 RX ADMIN — RAMIPRIL 5 MG: 5 CAPSULE ORAL at 04:51

## 2024-06-04 RX ADMIN — IOHEXOL 90 ML: 350 INJECTION, SOLUTION INTRAVENOUS at 18:07

## 2024-06-04 RX ADMIN — ROSUVASTATIN CALCIUM 20 MG: 20 TABLET, FILM COATED ORAL at 16:51

## 2024-06-04 ASSESSMENT — ENCOUNTER SYMPTOMS
MYALGIAS: 0
SHORTNESS OF BREATH: 1
SHORTNESS OF BREATH: 0
NEUROLOGICAL NEGATIVE: 1
VOMITING: 0
CARDIOVASCULAR NEGATIVE: 1
CONSTITUTIONAL NEGATIVE: 1
WEAKNESS: 0
NERVOUS/ANXIOUS: 0
MUSCULOSKELETAL NEGATIVE: 1
CHILLS: 0
NAUSEA: 0
DIZZINESS: 0
ABDOMINAL PAIN: 0
PALPITATIONS: 0
PSYCHIATRIC NEGATIVE: 1
COUGH: 0
BRUISES/BLEEDS EASILY: 0
RESPIRATORY NEGATIVE: 1
GASTROINTESTINAL NEGATIVE: 1
FEVER: 0
HEADACHES: 0
EYES NEGATIVE: 1

## 2024-06-04 ASSESSMENT — PAIN DESCRIPTION - PAIN TYPE: TYPE: ACUTE PAIN

## 2024-06-04 ASSESSMENT — FIBROSIS 4 INDEX: FIB4 SCORE: 2.17

## 2024-06-04 NOTE — CONSULTS
REFERRING PHYSICIAN: Jarrett Sweet MD.     CONSULTING PHYSICIAN: Trav Nix DO     CHIEF COMPLAINT: Chest pain and shortness of breath    HISTORY OF PRESENT ILLNESS: The patient is a 88 y.o. female with history of hypertension and hyperlipidemia. She presented to the emergency room in [place] with chest pain and shortness of breath. She was diagnosed with NSTEMI, severe aortic stenosis and was transferred to [facility] for a higher level of care. She had cardiac catheterization that showed mild to moderate coronary artery disease.   She is being worked up for TAVR. She currently is chest pain free. She still complains of shortness of breath with exertion. She denies lower extremity edema, syncope. She is active around her house. 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     ALLERGIES:   Allergies   Allergen Reactions    Codeine         CURRENT MEDICATIONS:   Current Facility-Administered Medications:     ondansetron (Zofran) syringe/vial injection 4 mg, 4 mg, Intravenous, Q4HRS PRN, EDGARDO SykesP., 4 mg at 06/04/24 0250    enoxaparin (Lovenox) inj 40 mg, 40 mg, Subcutaneous, DAILY AT 1800, Karla Macias M.D., 40 mg at 06/03/24 1721    rosuvastatin (Crestor) tablet 20 mg, 20 mg, Oral, Q EVENING, Kb Wilde M.D., 20 mg at 06/03/24 1721    ramipril (Altace) capsule 5 mg, 5 mg, Oral, Q DAY, Kb Wilde M.D., 5 mg at 06/04/24 0451    FAMILY HISTORY:  She has family history of coronary artery disease in her mom and dad    SOCIAL HISTORY:   Social History     Socioeconomic History    Marital status: Unknown     Spouse name: Not on file    Number of  children: Not on file    Years of education: Not on file    Highest education level: Not on file   Occupational History    Not on file   Tobacco Use    Smoking status: Not on file    Smokeless tobacco: Not on file   Substance and Sexual Activity    Alcohol use: Not on file    Drug use: Not on file    Sexual activity: Not on file   Other Topics Concern    Not on file   Social History Narrative    Not on file     Social Determinants of Health     Financial Resource Strain: Low Risk  (4/16/2024)    Received from [facility]    Financial Resource Strain     Difficulty of Paying Living Expenses: Not on file     Access to Reliable Phone: Not on file   Food Insecurity: No Food Insecurity (6/2/2024)    Hunger Vital Sign     Worried About Running Out of Food in the Last Year: Never true     Ran Out of Food in the Last Year: Never true   Transportation Needs: No Transportation Needs (6/2/2024)    PRAPARE - Transportation     Lack of Transportation (Medical): No     Lack of Transportation (Non-Medical): No   Physical Activity: Not on file   Stress: Not on file   Social Connections: Not on file   Intimate Partner Violence: Not At Risk (6/2/2024)    Humiliation, Afraid, Rape, and Kick questionnaire     Fear of Current or Ex-Partner: No     Emotionally Abused: No     Physically Abused: No     Sexually Abused: No   Housing Stability: Low Risk  (6/2/2024)    Housing Stability Vital Sign     Unable to Pay for Housing in the Last Year: No     Number of Places Lived in the Last Year: 1     Unstable Housing in the Last Year: No       REVIEW OF SYSTEMS:  Review of Systems   Constitutional: Negative.    HENT: Negative.     Eyes: Negative.    Respiratory:  Positive for shortness of breath.    Cardiovascular: Negative.    Gastrointestinal: Negative.    Genitourinary: Negative.    Musculoskeletal: Negative.    Skin: Negative.    Neurological: Negative.    Endo/Heme/Allergies: Negative.    Psychiatric/Behavioral: Negative.    "    PHYSICAL EXAMINATION:    BP (!) 153/60   Pulse 84   Temp 36.4 °C (97.5 °F) (Temporal)   Resp 18   Ht 1.651 m (5' 5\")   Wt 93 kg (205 lb 0.4 oz)   SpO2 91%   BMI 34.12 kg/m²      Physical Exam  Constitutional:       General: She is not in acute distress.     Appearance: Normal appearance.      Comments: frail   HENT:      Head: Normocephalic and atraumatic.      Nose: Nose normal.      Mouth/Throat:      Pharynx: Uvula midline.   Eyes:      Pupils: Pupils are equal, round, and reactive to light.   Neck:      Vascular: No carotid bruit or JVD.   Cardiovascular:      Rate and Rhythm: Normal rate and regular rhythm.      Heart sounds: Murmur heard.      Systolic murmur is present with a grade of 3/6.   Pulmonary:      Effort: Pulmonary effort is normal.      Breath sounds: Normal breath sounds.   Abdominal:      General: Bowel sounds are normal. There is no distension.      Palpations: Abdomen is soft.      Tenderness: There is no abdominal tenderness.   Musculoskeletal:         General: Normal range of motion.   Skin:     General: Skin is warm and dry.      Nails: There is no clubbing.   Neurological:      Mental Status: She is alert and oriented to person, place, and time.   Psychiatric:         Mood and Affect: Mood and affect normal.       LABS REVIEWED:  Lab Results   Component Value Date/Time    SODIUM 139 06/04/2024 03:00 AM    POTASSIUM 3.7 06/04/2024 03:00 AM    CHLORIDE 105 06/04/2024 03:00 AM    CO2 21 06/04/2024 03:00 AM    GLUCOSE 113 (H) 06/04/2024 03:00 AM    BUN 8 06/04/2024 03:00 AM    CREATININE 0.56 06/04/2024 03:00 AM      Lab Results   Component Value Date/Time    PROTHROMBTM 13.2 06/04/2024 03:00 AM    INR 0.99 06/04/2024 03:00 AM      Lab Results   Component Value Date/Time    WBC 10.3 06/04/2024 03:00 AM    RBC 4.86 06/04/2024 03:00 AM    HEMOGLOBIN 13.5 06/04/2024 03:00 AM    HEMATOCRIT 41.3 06/04/2024 03:00 AM    MCV 85.0 06/04/2024 03:00 AM    MCH 27.8 06/04/2024 03:00 AM    [facility] " 32.7 06/04/2024 03:00 AM    MPV 9.4 06/04/2024 03:00 AM    NEUTSPOLYS 71.10 06/04/2024 03:00 AM    LYMPHOCYTES 16.80 (L) 06/04/2024 03:00 AM    MONOCYTES 9.30 06/04/2024 03:00 AM    EOSINOPHILS 1.90 06/04/2024 03:00 AM    BASOPHILS 0.50 06/04/2024 03:00 AM        IMAGING REVIEWED AND INTERPRETED:    ECHOCARDIOGRAM [facility] 6/3/2024:  The ejection fraction is measured to be 59 % by Mullen's biplane.   Mild concentric left ventricular hypertrophy. Grade I diastolic dysfunction.  The right ventricle is normal in size and systolic function.  Mild mitral regurgitation.  Severe aortic valve stenosis. Vmax is 4.6 m/s. Transvalvular gradients   are - Peak: 85 mmHg, Mean: 48 mmHg.  Unable to estimate right ventricular systolic pressure due to an   inadequate tricuspid regurgitant jet.    CARDIAC CATHETERIZATION [facility] 6/3/2024:  The left main coronary artery appears to be diffusely narrowed with proximal 40% disease and distal 50% disease.  The left anterior descending coronary artery is a small and diffusely narrowed vessel without discrete significant stenosis.  It supplies two small and diffusely narrowed diagonal branches.  The left circumflex coronary artery is a small diffusely narrowed vessel without discrete significant stenosis.  It supplies one significant, branching obtuse marginal branch that is small and diffusely narrowed with discrete proximal 30% disease.  The right coronary artery is a moderate, dominant vessel with focal mid 50% disease and otherwise diffuse luminal irregularities.  It supplies a small caliber posterior descending coronary and a small caliber posterolateral branch.    CT SCAN CHEST   pending      IMPRESSION:  NSTEMI, Severe symptomatic aortic stenosis, HTN, HLd      PLAN:  I recommend proceeding with TAVR. Given her age and other issues, she is a poor candidate for open heart intervention.   The procedure, its risks, benefits, potential complications and alternative treatments were discussed with  the patient in detail including the risks should she decide not to undergo my recommended treatment. All of her questions were answered to her satisfaction and she is willing to proceed with the operation. The risks include death, stroke, infection: to include a rare bacterial infection related to the use of the heart/lung machine, dell-operative myocardial infarction, dysrhythmias, diaphragmatic paralysis, chest wall paresthesia, tracheostomy, kidney or other organ failure, possible return to the operating room for bleeding, bleeding requiring transfusion with its attendant risks including AIDS or hepatitis, dehiscence of surgical incisions, respiratory complications including the need for prolonged ventilator support, Protamine or other drug reaction, peripheral neuropathy, loss of limb, and miscount of surgical items. The operative mortality risk is approximately 10%. The STS mortality risk score is 8% and the morbidity and mortality risk score is 26% for AVR. The scores were discussed with patient.    Thank you for this very challenging consultation and participation in the patient’s care.  I will keep you apprised of all future developments.        Sincerely,       Trav Nix DO.        I,  Trav Nix DO performed a substantial portion of the service face-to-face with the same patient on the same date of service. I have reviewed and agree with the care plan and complexity of problems documented by me, Trav Nix DO and/or SHAINA De La Cruz. I was personally involved in the medical decision making, including the information below:  reviewing and interpreting the films, conducted elements of the history and physical exam, and provided the plan of care. All medical decision making was made by me.

## 2024-06-04 NOTE — PROGRESS NOTES
Cardiology Follow Up Progress Note    Date of Service  6/4/2024    Attending Physician  Radha Rowe M.D.    Chief Complaint   Severe aortic stenosis, NSTEMI, coronary artery disease, history of hypertension and hyperlipidemia.     JAYA Ly is a 88 y.o. female admitted 6/2/2024 with above.    Interim Events  She underwent cardiac catheterization as below.     Review of Systems  Review of Systems   Constitutional: Negative.  Negative for chills and fever.   HENT: Negative.  Negative for hearing loss.    Eyes: Negative.    Respiratory: Negative.  Negative for cough and shortness of breath.    Cardiovascular: Negative.  Negative for chest pain, palpitations and leg swelling.   Gastrointestinal: Negative.  Negative for abdominal pain, nausea and vomiting.   Genitourinary: Negative.  Negative for dysuria and urgency.   Musculoskeletal: Negative.  Negative for myalgias.   Skin: Negative.  Negative for rash.   Neurological: Negative.  Negative for dizziness, weakness and headaches.   Hematological:  Does not bruise/bleed easily.   Psychiatric/Behavioral: Negative.  The patient is not nervous/anxious.        Vital signs in last 24 hours  Temp:  [36.6 °C (97.9 °F)-37 °C (98.6 °F)] 36.7 °C (98.1 °F)  Pulse:  [75-96] 90  Resp:  [16-18] 16  BP: (120-165)/(55-99) 165/77  SpO2:  [91 %-96 %] 94 %    Physical Exam  Physical Exam  Constitutional:       Appearance: Normal appearance. She is well-developed and normal weight.   HENT:      Head: Normocephalic and atraumatic.      Mouth/Throat:      Mouth: Mucous membranes are moist.   Eyes:      Extraocular Movements: Extraocular movements intact.      Conjunctiva/sclera: Conjunctivae normal.   Cardiovascular:      Rate and Rhythm: Normal rate and regular rhythm.      Pulses: Normal pulses.      Heart sounds: Murmur heard.   Pulmonary:      Effort: Pulmonary effort is normal.      Breath sounds: Normal breath sounds.   Abdominal:      General: Bowel sounds are normal.       Palpations: Abdomen is soft.   Musculoskeletal:         General: Normal range of motion.      Cervical back: Normal range of motion and neck supple.   Skin:     General: Skin is warm and dry.   Neurological:      General: No focal deficit present.      Mental Status: She is alert and oriented to person, place, and time. Mental status is at baseline.   Psychiatric:         Mood and Affect: Mood normal.         Behavior: Behavior normal.         Thought Content: Thought content normal.         Judgment: Judgment normal.         Lab Review  Lab Results   Component Value Date/Time    WBC 10.3 06/04/2024 03:00 AM    RBC 4.86 06/04/2024 03:00 AM    HEMOGLOBIN 13.5 06/04/2024 03:00 AM    HEMATOCRIT 41.3 06/04/2024 03:00 AM    MCV 85.0 06/04/2024 03:00 AM    MCH 27.8 06/04/2024 03:00 AM    MCHC 32.7 06/04/2024 03:00 AM    MPV 9.4 06/04/2024 03:00 AM      Lab Results   Component Value Date/Time    SODIUM 139 06/04/2024 03:00 AM    POTASSIUM 3.7 06/04/2024 03:00 AM    CHLORIDE 105 06/04/2024 03:00 AM    CO2 21 06/04/2024 03:00 AM    GLUCOSE 113 (H) 06/04/2024 03:00 AM    BUN 8 06/04/2024 03:00 AM    CREATININE 0.56 06/04/2024 03:00 AM      Lab Results   Component Value Date/Time    ASTSGOT 23 06/04/2024 03:00 AM    ALTSGPT 12 06/04/2024 03:00 AM     Lab Results   Component Value Date/Time    CHOLSTRLTOT 183 01/15/2021 07:46 AM     (H) 01/15/2021 07:46 AM    HDL 51 01/15/2021 07:46 AM    TRIGLYCERIDE 194 (H) 01/15/2021 07:46 AM    TROPONINT 169 (H) 06/04/2024 03:00 AM       Recent Labs     06/02/24  2313 06/04/24  0300   NTPROBNP 6032* 6206*   (Above labs reviewed.)       Current Facility-Administered Medications:     ondansetron (Zofran) syringe/vial injection 4 mg, 4 mg, Intravenous, Q4HRS PRN, ROBERTO SykesN.P., 4 mg at 06/04/24 0250    enoxaparin (Lovenox) inj 40 mg, 40 mg, Subcutaneous, DAILY AT 1800, Karla Macias M.D., 40 mg at 06/03/24 1721    rosuvastatin (Crestor) tablet 20 mg, 20 mg, Oral, Q EVENING, Kb EMERSON  OMERO Wilde, 20 mg at 06/03/24 1721    ramipril (Altace) capsule 5 mg, 5 mg, Oral, Q DAY, Kb Wilde M.D., 5 mg at 06/04/24 0451  (Medications reviewed.)    Cardiac Imaging and Procedures Review  CARDIAC STUDIES/PROCEDURES:    CARDIAC CATHETERIZATION CONCLUSIONS by [facility] (06/04/24)  Relatively small and diffusely narrowed coronary vessels.  Approximately 50% distal left main coronary artery disease of unclear hemodynamic significance.  Mildly elevated left heart filling pressures.  Approximately 71 mmHg peak-to-peak transaortic gradient.  Normal ascending aorta diameter.  (study result reviewed)     ECHOCARDIOGRAM CONCLUSIONS (06/03/24)  Normal left ventricular systolic function.    The ejection fraction is measured to be 59% by Mullen's biplane.   Mild concentric left ventricular hypertrophy. Grade I diastolic dysfunction.  The right ventricle is normal in size and systolic function.  Mild mitral regurgitation.  Severe aortic valve stenosis.   Vmax is 4.6 m/s. Transvalvular gradients are - Peak: 85 mmHg, Mean: 48 mmHg.  Unable to estimate right ventricular systolic pressure due to an   inadequate tricuspid regurgitant jet.  No prior study is available for comparison.   Primary team is notified of findings.    EKG performed on (06/02/24) was reviewed: EKG personally interpreted shows sinus rhythm with right bundle branch block.     Assessment/Plan  NSTEMI, coronary artery disease with ostial left main coronary artery: The plans will be revisited by Markie Cruz tomorrow.  Severe aortic stenosis: Her aortic stenosis is severe status. We will discuss with structrural team  Health maintenance: She is from [place], [place].    Thank you for allowing me to participate in the care of this patient.  I will continue to follow this patient    Please contact me with any questions.    Jarrett Sweet M.D.   Cardiologist, [facility]  [phone]

## 2024-06-04 NOTE — PROGRESS NOTES
Monitor Summary:   Rhythm: sr  Rate: 79-79  Measurement: .23/.13/.37  Ectopy: pvc's, rare couplets bigeminy and trigeminy

## 2024-06-04 NOTE — PROGRESS NOTES
Bedside report received from day RN, pt care assumed, assessment completed. Pt is A&O4, pain 0/10, SR on the monitor. Updated on POC, questions answered. Bed in lowest, locked position, treaded socks on, call light and belongings within reach. Fall precautions in place.      Fall Risk Score: LOW RISK  Fall risk interventions in place: Provide patient/family education based on risk assessment  Bed type: Regular (Abel Score less than 17 interventions in place)  Patient on cardiac monitor: Yes  IVF/IV medications: Not Applicable   Oxygen: How many liters 2L, Traced the line to wall oxygen, and No oxygen tank in room  Bedside sitter: Not Applicable   Isolation: Not applicable

## 2024-06-04 NOTE — PROGRESS NOTES
"NOC HOSPITALIST CROSS COVER    Notified by RN regarding patient waking up tangled up in her sheets, felt like she could not move, not had sudden onset of dizziness, nausea, and feeling like she was \"going to die.\"    Patient was seen and evaluated at bedside.  She appeared uncomfortable, but in no acute distress.  She reported nausea and dizziness.  Her vitals were stable.  Fingerstick was checked and was noted to be 111.  Her lung sounds were clear to auscultation bilaterally.  She has a systolic murmur secondary to aortic stenosis.  Abdomen is soft, nontender nondistended.  No peripheral edema.    Twelve-lead EKG demonstrating normal sinus rhythm, rate 91, with a right bundle branch block and prolonged OH interval.  Chest x-ray demonstrating bilateral lower lobe infiltrates greater on the left and a small left pleural effusion, stable from prior.  Her troponin trended up slightly from 112-->169, though this is an expected finding given her heart cath yesterday.  Her NT proBNP also trended up slightly.  Her D-dimer is minimally elevated at 0.90.  Will defer CTA at this time given she just received contrast yesterday and low clinical suspicion for PE at this time.    Patient's symptoms improved after a dose of Zofran.  Continue to monitor closely on telemetry.      Vitals:    06/04/24 0239   BP: (!) 165/77   Pulse: 90   Resp: 16   Temp:    SpO2: 94%     -----------------------------------------------------------------------------------------------------------    Electronically signed by:  Trenton Giraldo, DONA, EDUAR MERRITT-BC  Hospitalist Services         "

## 2024-06-04 NOTE — CARE PLAN
Problem: Knowledge Deficit - Standard  Goal: Patient and family/care givers will demonstrate understanding of plan of care, disease process/condition, diagnostic tests and medications  Outcome: Progressing     Problem: Hemodynamics  Goal: Patient's hemodynamics, fluid balance and neurologic status will be stable or improve  Outcome: Progressing   The patient is Stable - Low risk of patient condition declining or worsening    Shift Goals  Clinical Goals: monitor telemetry    Progress made toward(s) clinical / shift goals:  progressing    Patient is not progressing towards the following goals:

## 2024-06-04 NOTE — PROGRESS NOTES
"Patient complaining of dizziness, nausea, states she \"feels like she is going to die\". APRN and rapid were called. EKG, labs, CXR ordered. Finger stick   "

## 2024-06-05 DIAGNOSIS — I35.0 NONRHEUMATIC AORTIC (VALVE) STENOSIS: ICD-10-CM

## 2024-06-05 DIAGNOSIS — Z00.6 EXAMINATION OF PARTICIPANT IN CLINICAL TRIAL: ICD-10-CM

## 2024-06-05 DIAGNOSIS — I35.0 SEVERE AORTIC STENOSIS: ICD-10-CM

## 2024-06-05 LAB
ANION GAP SERPL CALC-SCNC: 13 MMOL/L (ref 7–16)
BUN SERPL-MCNC: 8 MG/DL (ref 8–22)
CALCIUM SERPL-MCNC: 8.6 MG/DL (ref 8.5–10.5)
CHLORIDE SERPL-SCNC: 104 MMOL/L (ref 96–112)
CO2 SERPL-SCNC: 23 MMOL/L (ref 20–33)
CREAT SERPL-MCNC: 0.51 MG/DL (ref 0.5–1.4)
ERYTHROCYTE [DISTWIDTH] IN BLOOD BY AUTOMATED COUNT: 42.8 FL (ref 35.9–50)
GFR SERPLBLD CREATININE-BSD FMLA CKD-EPI: 90 ML/MIN/1.73 M 2
GLUCOSE SERPL-MCNC: 111 MG/DL (ref 65–99)
HCT VFR BLD AUTO: 38.2 % (ref 37–47)
HGB BLD-MCNC: 12.6 G/DL (ref 12–16)
MCH RBC QN AUTO: 28.3 PG (ref 27–33)
MCHC RBC AUTO-ENTMCNC: 33 G/DL (ref 32.2–35.5)
MCV RBC AUTO: 85.7 FL (ref 81.4–97.8)
PLATELET # BLD AUTO: 242 K/UL (ref 164–446)
PMV BLD AUTO: 9.5 FL (ref 9–12.9)
POTASSIUM SERPL-SCNC: 3.7 MMOL/L (ref 3.6–5.5)
RBC # BLD AUTO: 4.46 M/UL (ref 4.2–5.4)
SODIUM SERPL-SCNC: 140 MMOL/L (ref 135–145)
WBC # BLD AUTO: 8.2 K/UL (ref 4.8–10.8)

## 2024-06-05 PROCEDURE — 85027 COMPLETE CBC AUTOMATED: CPT

## 2024-06-05 PROCEDURE — 80048 BASIC METABOLIC PNL TOTAL CA: CPT

## 2024-06-05 PROCEDURE — 700102 HCHG RX REV CODE 250 W/ 637 OVERRIDE(OP): Performed by: STUDENT IN AN ORGANIZED HEALTH CARE EDUCATION/TRAINING PROGRAM

## 2024-06-05 PROCEDURE — 36415 COLL VENOUS BLD VENIPUNCTURE: CPT

## 2024-06-05 PROCEDURE — 770020 HCHG ROOM/CARE - TELE (206)

## 2024-06-05 PROCEDURE — A9270 NON-COVERED ITEM OR SERVICE: HCPCS | Performed by: STUDENT IN AN ORGANIZED HEALTH CARE EDUCATION/TRAINING PROGRAM

## 2024-06-05 PROCEDURE — 700111 HCHG RX REV CODE 636 W/ 250 OVERRIDE (IP): Mod: JZ | Performed by: STUDENT IN AN ORGANIZED HEALTH CARE EDUCATION/TRAINING PROGRAM

## 2024-06-05 PROCEDURE — 99223 1ST HOSP IP/OBS HIGH 75: CPT | Mod: FS | Performed by: THORACIC SURGERY (CARDIOTHORACIC VASCULAR SURGERY)

## 2024-06-05 PROCEDURE — 99233 SBSQ HOSP IP/OBS HIGH 50: CPT | Performed by: STUDENT IN AN ORGANIZED HEALTH CARE EDUCATION/TRAINING PROGRAM

## 2024-06-05 PROCEDURE — 99232 SBSQ HOSP IP/OBS MODERATE 35: CPT | Performed by: INTERNAL MEDICINE

## 2024-06-05 RX ADMIN — ENOXAPARIN SODIUM 40 MG: 100 INJECTION SUBCUTANEOUS at 16:34

## 2024-06-05 RX ADMIN — RAMIPRIL 5 MG: 5 CAPSULE ORAL at 05:04

## 2024-06-05 RX ADMIN — ROSUVASTATIN CALCIUM 20 MG: 20 TABLET, FILM COATED ORAL at 16:34

## 2024-06-05 ASSESSMENT — ENCOUNTER SYMPTOMS
MYALGIAS: 0
NEUROLOGICAL NEGATIVE: 1
FEVER: 0
PALPITATIONS: 0
SHORTNESS OF BREATH: 0
CHILLS: 0
CONSTITUTIONAL NEGATIVE: 1
MUSCULOSKELETAL NEGATIVE: 1
GASTROINTESTINAL NEGATIVE: 1
RESPIRATORY NEGATIVE: 1
DIZZINESS: 0
EYES NEGATIVE: 1
COUGH: 0
BRUISES/BLEEDS EASILY: 0
HEADACHES: 0
NAUSEA: 0
VOMITING: 0
PSYCHIATRIC NEGATIVE: 1
WEAKNESS: 0
NERVOUS/ANXIOUS: 0
ABDOMINAL PAIN: 0
CARDIOVASCULAR NEGATIVE: 1

## 2024-06-05 ASSESSMENT — PAIN DESCRIPTION - PAIN TYPE: TYPE: ACUTE PAIN

## 2024-06-05 ASSESSMENT — FIBROSIS 4 INDEX: FIB4 SCORE: 2.36

## 2024-06-05 NOTE — CARE PLAN
The patient is Watcher - Medium risk of patient condition declining or worsening    Shift Goals  Clinical Goals: Wean oxygen  Patient Goals: go home  Family Goals: POC updates    Progress made toward(s) clinical / shift goals:    Problem: Knowledge Deficit - Standard  Goal: Patient and family/care givers will demonstrate understanding of plan of care, disease process/condition, diagnostic tests and medications  Outcome: Progressing  Note: Discuss and review POC with patient/family. Re-educate as needed.       Problem: Hemodynamics  Goal: Patient's hemodynamics, fluid balance and neurologic status will be stable or improve  Outcome: Progressing       Patient is not progressing towards the following goals:

## 2024-06-05 NOTE — ASSESSMENT & PLAN NOTE
Noted on echo  Patient advised by cardiology and CTS surgery, will plan TAVR  S/p [facility] 6/3 Relatively small and diffusely narrowed coronary vessels. Approximately 50% distal left main coronary artery disease of unclear hemodynamic significance. 71 mmHg peak-to-peak transaortic gradient

## 2024-06-05 NOTE — PROGRESS NOTES
Monitor Summary:    SR 83-94  OPVC, RPVC, guillermina, trigem,   .24/.11/.36

## 2024-06-05 NOTE — CARE PLAN
Problem: Knowledge Deficit - Standard  Goal: Patient and family/care givers will demonstrate understanding of plan of care, disease process/condition, diagnostic tests and medications  Outcome: Progressing     Problem: Hemodynamics  Goal: Patient's hemodynamics, fluid balance and neurologic status will be stable or improve  Outcome: Progressing   The patient is Stable - Low risk of patient condition declining or worsening    Shift Goals  Clinical Goals: monitor vitals, telemetry    Progress made toward(s) clinical / shift goals:  progressing    Patient is not progressing towards the following goals:

## 2024-06-05 NOTE — PROGRESS NOTES
[facility] Medicine Daily Progress Note    Date of Service  6/4/2024    Chief Complaint  Kelly Ly is a 88 y.o. female admitted 6/2/2024 with NSTEMI    Hospital Course  Kelly Ly is a 88 y.o. female who presented 6/2/2024 with possible NSTEMI.  Very pleasant woman with history of hypertension and hyperlipidemia, otherwise healthy and active.  She presented to her local ED in [place] due to chest pain and shortness of breath.  She presented to outside ED.  EKG showed sinus rhythm with some ST depressions diffusely however wandering baseline, rate 90, QTc 512.  Troponin was elevated at 953, with upper limit of normal at 45.  Chest x-ray showed diffusely increased interstitial markings with mildly enlarged heart, calcified aorta.  She was transferred here for higher level of care due to NSTEMI.   S/p [facility] 6/3 Relatively small and diffusely narrowed coronary vessels. Approximately 50% distal left main coronary artery disease of unclear hemodynamic significance. 71 mmHg peak-to-peak transaortic gradient   Echo notes severe AS    Interval Problem Update  I saw and examined the patient at bedside  She denies current chest pain, no nausea vomiting  S/p LHC 6/3  Discussed with cardiology    proBNP 6032  Echo notes severe AS    I have discussed this patient's plan of care and discharge plan at IDT rounds today with Case Management, Nursing, Nursing leadership, and other members of the IDT team.    Consultants/Specialty  cardiology    Code Status  Full Code    Disposition  The patient is not medically cleared for discharge to home or a post-acute facility.      I have placed the appropriate orders for post-discharge needs.    Review of Systems  All 12 systems were reviewed and negative except as mentioned above       Physical Exam  Temp:  [36.3 °C (97.3 °F)-36.7 °C (98.1 °F)] 36.7 °C (98.1 °F)  Pulse:  [82-93] 92  Resp:  [16-20] 20  BP: ()/(60-77) 94/65  SpO2:  [91 %-98 %] 98 %    Physical Exam  Constitutional:        Appearance: She is ill-appearing.   HENT:      Head: Normocephalic.      Nose: Nose normal.      Mouth/Throat:      Mouth: Mucous membranes are moist.   Eyes:      Extraocular Movements: Extraocular movements intact.      Conjunctiva/sclera: Conjunctivae normal.   Cardiovascular:      Rate and Rhythm: Normal rate and regular rhythm.      Pulses: Normal pulses.      Heart sounds: Murmur heard.   Pulmonary:      Effort: Pulmonary effort is normal.      Breath sounds: Normal breath sounds. No wheezing or rales.   Abdominal:      General: Bowel sounds are normal.      Palpations: Abdomen is soft.      Tenderness: There is no abdominal tenderness. There is no guarding.   Musculoskeletal:         General: No swelling or tenderness.      Cervical back: Normal range of motion.   Skin:     General: Skin is warm.   Neurological:      Mental Status: She is alert and oriented to person, place, and time. Mental status is at baseline.   Psychiatric:         Mood and Affect: Mood normal.         Fluids    Intake/Output Summary (Last 24 hours) at 6/4/2024 1855  Last data filed at 6/4/2024 0700  Gross per 24 hour   Intake 240 ml   Output 1750 ml   Net -1510 ml       Laboratory  Recent Labs     06/02/24 2313 06/04/24  0300   WBC 11.6* 10.3   RBC 5.06 4.86   HEMOGLOBIN 14.0 13.5   HEMATOCRIT 42.3 41.3   MCV 83.6 85.0   MCH 27.7 27.8   MCHC 33.1 32.7   RDW 42.0 42.5   PLATELETCT 247 263   MPV 9.0 9.4     Recent Labs     06/02/24 2313 06/04/24  0300   SODIUM 139 139   POTASSIUM 3.7 3.7   CHLORIDE 103 105   CO2 21 21   GLUCOSE 134* 113*   BUN 9 8   CREATININE 0.60 0.56   CALCIUM 9.0 8.5     Recent Labs     06/02/24 2313 06/04/24  0300   APTT 27.5  --    INR 1.02 0.99               Imaging  DX-CHEST-PORTABLE (1 VIEW)   Final Result         1.  Bilateral lower lobe infiltrates, greater on the left.   2.  Small left pleural effusion   3.  Cardiomegaly   4.  Atherosclerosis      EC-ECHOCARDIOGRAM COMPLETE W/O CONT   Final Result       OUTSIDE IMAGES-DX CHEST   Final Result      OUTSIDE IMAGES-DX CHEST   Final Result      CL-LEFT HEART CATHETERIZATION WITH POSSIBLE INTERVENTION    (Results Pending)   CT-TAVR CHEST-ABD-PELVIS W/WO POST PROCESS W/AV SCORE    (Results Pending)        Assessment/Plan  * NSTEMI (non-ST elevated myocardial infarction) (HCC)- (present on admission)  Assessment & Plan  Troponin 953 at outside hospital where upper limit of normal is 45.  Started on heparin drip prior to transfer.    Troponin 128 > 152  S/p [facility] 6/3 Relatively small and diffusely narrowed coronary vessels. Approximately 50% distal left main coronary artery disease of unclear hemodynamic significance. 71 mmHg peak-to-peak transaortic gradient   Echo notes severe AS    Severe aortic stenosis  Assessment & Plan  Noted on echo  Cardiology team following     Hyperlipidemia  Assessment & Plan  Continue rosuvastatin    Primary hypertension  Assessment & Plan  Continue amlodipine           VTE prophylaxis: lovenox    I have performed a physical exam and reviewed and updated ROS and Plan today (6/4/2024). In review of yesterday's note (6/3/2024), there are no changes except as documented above.    Patient is has a high medical complexity, complex decision making and is at high risk for complication, morbidity, and mortality.  I spent 53 minutes, reviewing the chart, obtaining and/or reviewing separately obtained history. Performing a medically appropriate examination and evaluation.  Counseling and educating the patient. Ordering and reviewing medications, tests, or procedures.  Discussing the case with cardiology.  Documenting clinical information in EPIC. Independently interpreting results and communicating results to patient. Discussing future disposition of care with patient, RN and case management.  This does not include time spent on separately billable procedures/tests.

## 2024-06-05 NOTE — DOCUMENTATION QUERY
[facility]                                                                       Query Response Note      PATIENT:               ROBIN BENSON  ACCT #:                  [id number]  MRN:                     [id number]  :                      1935  ADMIT DATE:       2024 10:13 PM  DISCH DATE:          RESPONDING  PROVIDER #:        [id number]           QUERY TEXT:    Based on the clinical indicators outlined above, please clarify if the following has been treated/evaluated during this hospitalization:    Please add your response to this query in your progress notes if you agree.    Thank you.      The patient's Clinical Indicators include:   Hospitalist note: Congestive heart failure.  6/3 ECHO: EF 59%    Risk factor: Hypertension    Treatment: Ramipril    Thank you,  Cristina Cavazos NP, CCDS   Clinical   Connect via [employer]  Options provided:   -- Chronic diastolic heart failure   -- Other CHF, Please specify   -- Other explanation, Please specify      Query created by: Cristina Cavazos on 2024 9:42 AM    RESPONSE TEXT:    Chronic diastolic heart failure          Electronically signed by:  RAKAN ZAMORA MD 2024 8:16 AM

## 2024-06-05 NOTE — PROGRESS NOTES
Cardiology Follow Up Progress Note    Date of Service  6/5/2024    Attending Physician  Radha Rowe M.D.    Chief Complaint   Severe aortic stenosis, NSTEMI, coronary artery disease, history of hypertension and hyperlipidemia.     JAYA Ly is a 88 y.o. female admitted 6/2/2024 with above.    Interim Events  She was consulted by cardiothoracic surgery and was recommended TAVR.     Review of Systems  Review of Systems   Constitutional: Negative.  Negative for chills and fever.   HENT: Negative.  Negative for hearing loss.    Eyes: Negative.    Respiratory: Negative.  Negative for cough and shortness of breath.    Cardiovascular: Negative.  Negative for chest pain, palpitations and leg swelling.   Gastrointestinal: Negative.  Negative for abdominal pain, nausea and vomiting.   Genitourinary: Negative.  Negative for dysuria and urgency.   Musculoskeletal: Negative.  Negative for myalgias.   Skin: Negative.  Negative for rash.   Neurological: Negative.  Negative for dizziness, weakness and headaches.   Hematological:  Does not bruise/bleed easily.   Psychiatric/Behavioral: Negative.  The patient is not nervous/anxious.        Vital signs in last 24 hours  Temp:  [36.4 °C (97.5 °F)-36.7 °C (98.1 °F)] 36.7 °C (98.1 °F)  Pulse:  [81-96] 88  Resp:  [18-20] 20  BP: ()/(49-91) 115/91  SpO2:  [91 %-98 %] 97 %    Physical Exam  Physical Exam  Constitutional:       Appearance: Normal appearance. She is well-developed and normal weight.   HENT:      Head: Normocephalic and atraumatic.      Mouth/Throat:      Mouth: Mucous membranes are moist.   Eyes:      Extraocular Movements: Extraocular movements intact.      Conjunctiva/sclera: Conjunctivae normal.   Cardiovascular:      Rate and Rhythm: Normal rate and regular rhythm.      Pulses: Normal pulses.      Heart sounds: Murmur heard.   Pulmonary:      Effort: Pulmonary effort is normal.      Breath sounds: Normal breath sounds.   Abdominal:      General: Bowel  sounds are normal.      Palpations: Abdomen is soft.   Musculoskeletal:         General: Normal range of motion.      Cervical back: Normal range of motion and neck supple.   Skin:     General: Skin is warm and dry.   Neurological:      General: No focal deficit present.      Mental Status: She is alert and oriented to person, place, and time. Mental status is at baseline.   Psychiatric:         Mood and Affect: Mood normal.         Behavior: Behavior normal.         Thought Content: Thought content normal.         Judgment: Judgment normal.         Lab Review  Lab Results   Component Value Date/Time    WBC 8.2 06/05/2024 12:37 AM    RBC 4.46 06/05/2024 12:37 AM    HEMOGLOBIN 12.6 06/05/2024 12:37 AM    HEMATOCRIT 38.2 06/05/2024 12:37 AM    MCV 85.7 06/05/2024 12:37 AM    MCH 28.3 06/05/2024 12:37 AM    MCHC 33.0 06/05/2024 12:37 AM    MPV 9.5 06/05/2024 12:37 AM      Lab Results   Component Value Date/Time    SODIUM 140 06/05/2024 12:37 AM    POTASSIUM 3.7 06/05/2024 12:37 AM    CHLORIDE 104 06/05/2024 12:37 AM    CO2 23 06/05/2024 12:37 AM    GLUCOSE 111 (H) 06/05/2024 12:37 AM    BUN 8 06/05/2024 12:37 AM    CREATININE 0.51 06/05/2024 12:37 AM      Lab Results   Component Value Date/Time    ASTSGOT 23 06/04/2024 03:00 AM    ALTSGPT 12 06/04/2024 03:00 AM     Lab Results   Component Value Date/Time    CHOLSTRLTOT 183 01/15/2021 07:46 AM     (H) 01/15/2021 07:46 AM    HDL 51 01/15/2021 07:46 AM    TRIGLYCERIDE 194 (H) 01/15/2021 07:46 AM    TROPONINT 169 (H) 06/04/2024 03:00 AM       Recent Labs     06/02/24  2313 06/04/24  0300   NTPROBNP 6032* 6206*   (Above labs reviewed.)       Current Facility-Administered Medications:     ondansetron (Zofran) syringe/vial injection 4 mg, 4 mg, Intravenous, Q4HRS PRN, EDGARDO SykesP., 4 mg at 06/04/24 0250    enoxaparin (Lovenox) inj 40 mg, 40 mg, Subcutaneous, DAILY AT 1800, Karla Macias M.D., 40 mg at 06/04/24 1651    rosuvastatin (Crestor) tablet 20 mg, 20 mg,  Oral, Q EVENING, Kb Wilde M.D., 20 mg at 06/04/24 1651    ramipril (Altace) capsule 5 mg, 5 mg, Oral, Q DAY, Kb Wilde M.D., 5 mg at 06/05/24 0504  (Medications reviewed.)    Cardiac Imaging and Procedures Review  CARDIAC STUDIES/PROCEDURES:     CARDIAC CATHETERIZATION CONCLUSIONS by Den Bello (06/04/24)  Relatively small and diffusely narrowed coronary vessels.  Approximately 50% distal left main coronary artery disease of unclear hemodynamic significance.  Mildly elevated left heart filling pressures.  Approximately 71 mmHg peak-to-peak transaortic gradient.  Normal ascending aorta diameter.    ECHOCARDIOGRAM CONCLUSIONS (06/03/24)  Normal left ventricular systolic function.    The ejection fraction is measured to be 59% by Mullen's biplane.   Mild concentric left ventricular hypertrophy. Grade I diastolic dysfunction.  The right ventricle is normal in size and systolic function.  Mild mitral regurgitation.  Severe aortic valve stenosis.   Vmax is 4.6 m/s. Transvalvular gradients are - Peak: 85 mmHg, Mean: 48 mmHg.  Unable to estimate right ventricular systolic pressure due to an   inadequate tricuspid regurgitant jet.  No prior study is available for comparison.   Primary team is notified of findings.     EKG performed on (06/02/24) EKG shows sinus rhythm with right bundle branch block.     Assessment/Plan  Severe aortic stenosis: She is pending TAVR. She understands the risks and benefits and agrees with plan.   NSTEMI, coronary artery disease: She remains clinically doing well. I will continue with current medical care.  Health maintenance: She is from [place], [place]    Thank you for allowing me to participate in the care of this patient.  I will continue to follow this patient    Please contact me with any questions.    Jarrett Sweet M.D.   Cardiologist, [facility]  [phone]

## 2024-06-05 NOTE — PROGRESS NOTES
Bedside report received from day RN, pt care assumed, assessment completed. Pt is A&O4, pain 0/10, SR on the monitor. Updated on POC, questions answered. Bed in lowest, locked position, treaded socks on, call light and belongings within reach. Fall precautions in place.      Fall Risk Score: NO RISK  Fall risk interventions in place: Provide patient/family education based on risk assessment  Bed type: Regular (Abel Score less than 17 interventions in place)  Patient on cardiac monitor: Yes  IVF/IV medications: Not Applicable   Oxygen: How many liters 1L, Traced the line to wall oxygen, and No oxygen tank in room  Bedside sitter: Not Applicable   Isolation: Not applicable

## 2024-06-05 NOTE — PROGRESS NOTES
Hospital Medicine Daily Progress Note    Date of Service  6/5/2024    Chief Complaint  Kelly Ly is a 88 y.o. female admitted 6/2/2024 with NSTEMI    Hospital Course  Kelly Ly is a 88 y.o. female who presented 6/2/2024 with possible NSTEMI.  Very pleasant woman with history of hypertension and hyperlipidemia, otherwise healthy and active.  She presented to her local ED in [place] due to chest pain and shortness of breath.  She presented to outside ED.  EKG showed sinus rhythm with some ST depressions diffusely however wandering baseline, rate 90, QTc 512.  Troponin was elevated at 953, with upper limit of normal at 45.  Chest x-ray showed diffusely increased interstitial markings with mildly enlarged heart, calcified aorta.  She was transferred here for higher level of care due to NSTEMI.   S/p [facility] 6/3 Relatively small and diffusely narrowed coronary vessels. Approximately 50% distal left main coronary artery disease of unclear hemodynamic significance. 71 mmHg peak-to-peak transaortic gradient   Echo notes severe AS.  Seen by cardiology and CTS, recommended to TAVR    Interval Problem Update  I saw and examined the patient at bedside  She denies current chest pain, no nausea vomiting  S/p [facility] 6/3  Discussed with cardiology and CTS, planning TAVR    I have discussed this patient's plan of care and discharge plan at IDT rounds today with Case Management, Nursing, Nursing leadership, and other members of the IDT team.    Consultants/Specialty  cardiology    Code Status  Full Code    Disposition  The patient is not medically cleared for discharge to home or a post-acute facility.      I have placed the appropriate orders for post-discharge needs.    Review of Systems  All 12 systems were reviewed and negative except as mentioned above       Physical Exam  Temp:  [36.5 °C (97.7 °F)-36.9 °C (98.4 °F)] 36.9 °C (98.4 °F)  Pulse:  [81-96] 95  Resp:  [16-20] 16  BP: (115-157)/(49-91) 137/65  SpO2:  [90 %-97 %]  94 %    Physical Exam  Constitutional:       Appearance: She is ill-appearing.   HENT:      Head: Normocephalic.      Nose: Nose normal.      Mouth/Throat:      Mouth: Mucous membranes are moist.   Eyes:      Extraocular Movements: Extraocular movements intact.      Conjunctiva/sclera: Conjunctivae normal.   Cardiovascular:      Rate and Rhythm: Normal rate and regular rhythm.      Pulses: Normal pulses.      Heart sounds: Murmur heard.   Pulmonary:      Effort: Pulmonary effort is normal.      Breath sounds: Normal breath sounds. No wheezing or rales.   Abdominal:      General: Bowel sounds are normal.      Palpations: Abdomen is soft.      Tenderness: There is no abdominal tenderness. There is no guarding.   Musculoskeletal:         General: No swelling or tenderness.      Cervical back: Normal range of motion.   Skin:     General: Skin is warm.   Neurological:      Mental Status: She is alert and oriented to person, place, and time. Mental status is at baseline.   Psychiatric:         Mood and Affect: Mood normal.         Fluids    Intake/Output Summary (Last 24 hours) at 6/5/2024 1654  Last data filed at 6/5/2024 0801  Gross per 24 hour   Intake 600 ml   Output 300 ml   Net 300 ml       Laboratory  Recent Labs     06/02/24 2313 06/04/24  0300 06/05/24  0037   WBC 11.6* 10.3 8.2   RBC 5.06 4.86 4.46   HEMOGLOBIN 14.0 13.5 12.6   HEMATOCRIT 42.3 41.3 38.2   MCV 83.6 85.0 85.7   MCH 27.7 27.8 28.3   MCHC 33.1 32.7 33.0   RDW 42.0 42.5 42.8   PLATELETCT 247 263 242   MPV 9.0 9.4 9.5     Recent Labs     06/02/24 2313 06/04/24  0300 06/05/24  0037   SODIUM 139 139 140   POTASSIUM 3.7 3.7 3.7   CHLORIDE 103 105 104   CO2 21 21 23   GLUCOSE 134* 113* 111*   BUN 9 8 8   CREATININE 0.60 0.56 0.51   CALCIUM 9.0 8.5 8.6     Recent Labs     06/02/24 2313 06/04/24  0300   APTT 27.5  --    INR 1.02 0.99               Imaging  DX-CHEST-PORTABLE (1 VIEW)   Final Result         1.  Bilateral lower lobe infiltrates, greater on  the left.   2.  Small left pleural effusion   3.  Cardiomegaly   4.  Atherosclerosis      EC-ECHOCARDIOGRAM COMPLETE W/O CONT   Final Result      OUTSIDE IMAGES-DX CHEST   Final Result      OUTSIDE IMAGES-DX CHEST   Final Result      CL-LEFT HEART CATHETERIZATION WITH POSSIBLE INTERVENTION    (Results Pending)   CT-TAVR CHEST-ABD-PELVIS W/WO POST PROCESS W/AV SCORE    (Results Pending)        Assessment/Plan  * NSTEMI (non-ST elevated myocardial infarction) (HCC)- (present on admission)  Assessment & Plan  Troponin 953 at outside hospital where upper limit of normal is 45.  Started on heparin drip prior to transfer.    Troponin 128 > 152  S/p [facility] 6/3 Relatively small and diffusely narrowed coronary vessels. Approximately 50% distal left main coronary artery disease of unclear hemodynamic significance. 71 mmHg peak-to-peak transaortic gradient   Echo notes severe AS    Severe aortic stenosis  Assessment & Plan  Noted on echo  Patient advised by cardiology and CTS surgery, will plan TAVR  S/p [facility] 6/3 Relatively small and diffusely narrowed coronary vessels. Approximately 50% distal left main coronary artery disease of unclear hemodynamic significance. 71 mmHg peak-to-peak transaortic gradient     Hyperlipidemia  Assessment & Plan  Continue rosuvastatin    Primary hypertension  Assessment & Plan  Continue amlodipine           VTE prophylaxis: lovenox    I have performed a physical exam and reviewed and updated ROS and Plan today (6/5/2024). In review of yesterday's note (6/4/2024), there are no changes except as documented above.    Patient is has a high medical complexity, complex decision making and is at high risk for complication, morbidity, and mortality.  I spent 51 minutes, reviewing the chart, obtaining and/or reviewing separately obtained history. Performing a medically appropriate examination and evaluation.  Counseling and educating the patient. Ordering and reviewing medications, tests, or procedures.   Discussing the case with cardiology.  Documenting clinical information in EPIC. Independently interpreting results and communicating results to patient. Discussing future disposition of care with patient, RN and case management.  This does not include time spent on separately billable procedures/tests.

## 2024-06-05 NOTE — PROGRESS NOTES
Bedside report received from off going RN/tech: Michael assumed care of patient.     Fall Risk Score: NO RISK  Fall risk interventions in place: Provide patient/family education based on risk assessment, Educate patient/family to call staff for assistance when getting out of bed, and Utilize bed/chair fall alarm  Bed type: Regular (Abel Score less than 17 interventions in place)  Patient on cardiac monitor: Yes  IVF/IV medications: Not Applicable   Oxygen: How many liters 1L  Bedside sitter: Not Applicable   Isolation: Not applicable

## 2024-06-06 ENCOUNTER — DOCUMENTATION (OUTPATIENT)
Dept: CARDIOLOGY | Facility: MEDICAL CENTER | Age: 89
End: 2024-06-06
Payer: MEDICARE

## 2024-06-06 ENCOUNTER — HOSPITAL ENCOUNTER (OUTPATIENT)
Dept: RADIOLOGY | Facility: MEDICAL CENTER | Age: 89
End: 2024-06-06
Payer: MEDICARE

## 2024-06-06 PROBLEM — N28.89 RENAL MASS: Status: ACTIVE | Noted: 2024-06-06

## 2024-06-06 PROCEDURE — 99232 SBSQ HOSP IP/OBS MODERATE 35: CPT | Performed by: INTERNAL MEDICINE

## 2024-06-06 PROCEDURE — 700102 HCHG RX REV CODE 250 W/ 637 OVERRIDE(OP): Performed by: STUDENT IN AN ORGANIZED HEALTH CARE EDUCATION/TRAINING PROGRAM

## 2024-06-06 PROCEDURE — 770020 HCHG ROOM/CARE - TELE (206)

## 2024-06-06 PROCEDURE — 700111 HCHG RX REV CODE 636 W/ 250 OVERRIDE (IP): Mod: JZ | Performed by: STUDENT IN AN ORGANIZED HEALTH CARE EDUCATION/TRAINING PROGRAM

## 2024-06-06 PROCEDURE — A9270 NON-COVERED ITEM OR SERVICE: HCPCS | Performed by: STUDENT IN AN ORGANIZED HEALTH CARE EDUCATION/TRAINING PROGRAM

## 2024-06-06 PROCEDURE — 99233 SBSQ HOSP IP/OBS HIGH 50: CPT | Performed by: STUDENT IN AN ORGANIZED HEALTH CARE EDUCATION/TRAINING PROGRAM

## 2024-06-06 RX ADMIN — ENOXAPARIN SODIUM 40 MG: 100 INJECTION SUBCUTANEOUS at 17:44

## 2024-06-06 RX ADMIN — RAMIPRIL 5 MG: 5 CAPSULE ORAL at 05:07

## 2024-06-06 RX ADMIN — ROSUVASTATIN CALCIUM 20 MG: 20 TABLET, FILM COATED ORAL at 17:44

## 2024-06-06 ASSESSMENT — ENCOUNTER SYMPTOMS
CARDIOVASCULAR NEGATIVE: 1
CONSTITUTIONAL NEGATIVE: 1
ABDOMINAL PAIN: 0
DIZZINESS: 0
MYALGIAS: 0
MUSCULOSKELETAL NEGATIVE: 1
WEAKNESS: 0
COUGH: 0
CHILLS: 0
FEVER: 0
HEADACHES: 0
EYES NEGATIVE: 1
RESPIRATORY NEGATIVE: 1
SHORTNESS OF BREATH: 0
VOMITING: 0
NAUSEA: 0
NEUROLOGICAL NEGATIVE: 1
GASTROINTESTINAL NEGATIVE: 1
PALPITATIONS: 0
BRUISES/BLEEDS EASILY: 0
NERVOUS/ANXIOUS: 0
PSYCHIATRIC NEGATIVE: 1

## 2024-06-06 ASSESSMENT — FIBROSIS 4 INDEX: FIB4 SCORE: 2.36

## 2024-06-06 ASSESSMENT — PAIN DESCRIPTION - PAIN TYPE: TYPE: ACUTE PAIN

## 2024-06-06 NOTE — CARE PLAN
The patient is Stable - Low risk of patient condition declining or worsening    Shift Goals  Clinical Goals: NPO MN, safety  Patient Goals: Sleep  Family Goals: MARCOS    Progress made toward(s) clinical / shift goals:      Problem: Knowledge Deficit - Standard  Goal: Patient and family/care givers will demonstrate understanding of plan of care, disease process/condition, diagnostic tests and medications  Description: Target End Date:  1-3 days or as soon as patient condition allows    Document in Patient Education    1.  Patient and family/caregiver oriented to unit, equipment, visitation policy and means for communicating concern  2.  Complete/review Learning Assessment  3.  Assess knowledge level of disease process/condition, treatment plan, diagnostic tests and medications  4.  Explain disease process/condition, treatment plan, diagnostic tests and medications  Outcome: Progressing     Problem: Hemodynamics  Goal: Patient's hemodynamics, fluid balance and neurologic status will be stable or improve  Description: Target End Date:  Prior to discharge or change in level of care    Document on Assessment and I/O flowsheet templates    1.  Monitor vital signs, pulse oximetry and cardiac monitor per provider order and/or policy  2.  Maintain blood pressure per provider order  3.  Hemodynamic monitoring per provider order  4.  Manage IV fluids and IV infusions  5.  Monitor intake and output  6.  Daily weights per unit policy or provider order  7.  Assess peripheral pulses and capillary refill  8.  Assess color and body temperature  9.  Position patient for maximum circulation/cardiac output  10. Monitor for signs/symptoms of excessive bleeding  11. Assess mental status, restlessness and changes in level of consciousness  12. Monitor temperature and report fever or hypothermia to provider immediately. Consideration of targeted temperature management.  Outcome: Progressing

## 2024-06-06 NOTE — ASSESSMENT & PLAN NOTE
CT TAVR notes 5.5cm solid mass in the lower pole of left kidney, keeping with renal cell carcinoma.  Additional 2.1 cm mass in the left adrenal gland, indeterminant but could be metastasis.   I have consulted urology

## 2024-06-06 NOTE — PROGRESS NOTES
Monitor Summary  Rhythm: SR  Rate: 80-97  Ectopy: fPVC, rCoup, rBigem  .24 / .12 / .39

## 2024-06-06 NOTE — PROGRESS NOTES
Hospital Medicine Daily Progress Note    Date of Service  6/6/2024    Chief Complaint  Kelly Ly is a 88 y.o. female admitted 6/2/2024 with NSTEMI    Hospital Course  Kelly Ly is a 88 y.o. female who presented 6/2/2024 with possible NSTEMI.  Very pleasant woman with history of hypertension and hyperlipidemia, otherwise healthy and active.  She presented to her local ED in [place] due to chest pain and shortness of breath.  She presented to outside ED.  EKG showed sinus rhythm with some ST depressions diffusely however wandering baseline, rate 90, QTc 512.  Troponin was elevated at 953, with upper limit of normal at 45.  Chest x-ray showed diffusely increased interstitial markings with mildly enlarged heart, calcified aorta.  She was transferred here for higher level of care due to NSTEMI.   S/p [facility] 6/3 Relatively small and diffusely narrowed coronary vessels. Approximately 50% distal left main coronary artery disease of unclear hemodynamic significance. 71 mmHg peak-to-peak transaortic gradient   Echo notes severe AS.  Seen by cardiology and CTS, recommended to TAVR  CT TAVR ntoes 5.5cm solid mass in the lower pole of left kidney, keeping with renal cell carcinoma.  Additional 2.1 cm mass in the left adrenal gland, indeterminant but could be metastasis.     Interval Problem Update  I saw and examined the patient at bedside  She denies current chest pain, no nausea vomiting  Continue rampiril and statin  Labs reviewed  Discussed with cardiology. TAVR scheduled on Monday     Addendum: reviewed CT TAVR notes 5.5cm solid mass in the lower pole of left kidney, keeping with renal cell carcinoma.  Additional 2.1 cm mass in the left adrenal gland, indeterminant but could be metastasis.   I have consulted and discussed with urology    I have discussed this patient's plan of care and discharge plan at IDT rounds today with Case Management, Nursing, Nursing leadership, and other members of the IDT  team.    Consultants/Specialty  cardiology  urology    Code Status  Full Code    Disposition  The patient is not medically cleared for discharge to home or a post-acute facility.      I have placed the appropriate orders for post-discharge needs.    Review of Systems  All 12 systems were reviewed and negative except as mentioned above       Physical Exam  Temp:  [36.6 °C (97.9 °F)-36.9 °C (98.4 °F)] 36.7 °C (98.1 °F)  Pulse:  [85-95] 93  Resp:  [16-20] 16  BP: (128-161)/(55-81) 152/65  SpO2:  [90 %-95 %] 92 %    Physical Exam  Constitutional:       Appearance: She is ill-appearing.   HENT:      Head: Normocephalic.      Nose: Nose normal.      Mouth/Throat:      Mouth: Mucous membranes are moist.   Eyes:      Extraocular Movements: Extraocular movements intact.      Conjunctiva/sclera: Conjunctivae normal.   Cardiovascular:      Rate and Rhythm: Normal rate and regular rhythm.      Pulses: Normal pulses.      Heart sounds: Murmur heard.   Pulmonary:      Effort: Pulmonary effort is normal.      Breath sounds: Normal breath sounds. No wheezing or rales.   Abdominal:      General: Bowel sounds are normal.      Palpations: Abdomen is soft.      Tenderness: There is no abdominal tenderness. There is no guarding.   Musculoskeletal:         General: No swelling or tenderness.      Cervical back: Normal range of motion.   Skin:     General: Skin is warm.   Neurological:      Mental Status: She is alert and oriented to person, place, and time. Mental status is at baseline.   Psychiatric:         Mood and Affect: Mood normal.         Fluids    Intake/Output Summary (Last 24 hours) at 6/6/2024 1609  Last data filed at 6/6/2024 1446  Gross per 24 hour   Intake 720 ml   Output --   Net 720 ml       Laboratory  Recent Labs     06/04/24  0300 06/05/24  0037   WBC 10.3 8.2   RBC 4.86 4.46   HEMOGLOBIN 13.5 12.6   HEMATOCRIT 41.3 38.2   MCV 85.0 85.7   MCH 27.8 28.3   MCHC 32.7 33.0   RDW 42.5 42.8   PLATELETCT 263 242   MPV 9.4  9.5     Recent Labs     06/04/24  0300 06/05/24  0037   SODIUM 139 140   POTASSIUM 3.7 3.7   CHLORIDE 105 104   CO2 21 23   GLUCOSE 113* 111*   BUN 8 8   CREATININE 0.56 0.51   CALCIUM 8.5 8.6     Recent Labs     06/04/24  0300   INR 0.99               Imaging  OUTSIDE IMAGES-DX CHEST   Final Result      CT-TAVR CHEST-ABD-PELVIS W/WO POST PROCESS W/AV SCORE   Final Result         1. A 5.5 cm solid mass in the lower pole left kidney, in keeping with renal cell carcinoma.      Additional 2.1 cm mass in the left adrenal gland, indeterminant but could be metastasis.      2. Right pulmonary nodules measuring up to 5 mm. These are indeterminant but could be metastasis given the renal mass.      3. Tricuspid aortic valve with moderate calcifications.      Annulus area: 404 mm?   Diameter at the level of the sinuses: 26.4 x 26.9 x 26.4 mm      Small distance from the aortic annulus to the coronary origin:   Left coronary ostium: 8.7 mm   Right coronary ostium 9.4 mm      4. Access evaluation:   Mild scattered calcification and low density plaque.   Measurement, minimal luminal diameter:      Right mid external iliac artery:   7.4 mm   Left mid external iliac artery: 7.3 mm      5. Bilateral pleural effusions with bibasilar atelectasis.      DX-CHEST-PORTABLE (1 VIEW)   Final Result         1.  Bilateral lower lobe infiltrates, greater on the left.   2.  Small left pleural effusion   3.  Cardiomegaly   4.  Atherosclerosis      EC-ECHOCARDIOGRAM COMPLETE W/O CONT   Final Result      OUTSIDE IMAGES-DX CHEST   Final Result      OUTSIDE IMAGES-DX CHEST   Final Result      CL-LEFT HEART CATHETERIZATION WITH POSSIBLE INTERVENTION    (Results Pending)        Assessment/Plan  * NSTEMI (non-ST elevated myocardial infarction) (HCC)- (present on admission)  Assessment & Plan  Troponin 953 at outside hospital where upper limit of normal is 45.  Started on heparin drip prior to transfer.    Troponin 128 > 152  S/p [facility] 6/3 Relatively  small and diffusely narrowed coronary vessels. Approximately 50% distal left main coronary artery disease of unclear hemodynamic significance. 71 mmHg peak-to-peak transaortic gradient   Echo notes severe AS    Renal mass  Assessment & Plan  CT TAVR notes 5.5cm solid mass in the lower pole of left kidney, keeping with renal cell carcinoma.  Additional 2.1 cm mass in the left adrenal gland, indeterminant but could be metastasis.   I have consulted urology    Severe aortic stenosis  Assessment & Plan  Noted on echo  Patient advised by cardiology and CTS surgery, will plan TAVR  S/p [facility] 6/3 Relatively small and diffusely narrowed coronary vessels. Approximately 50% distal left main coronary artery disease of unclear hemodynamic significance. 71 mmHg peak-to-peak transaortic gradient     Hyperlipidemia  Assessment & Plan  Continue rosuvastatin    Primary hypertension  Assessment & Plan  Continue Ramipril           VTE prophylaxis: lovenox    I have performed a physical exam and reviewed and updated ROS and Plan today (6/6/2024). In review of yesterday's note (6/5/2024), there are no changes except as documented above.    Patient is has a high medical complexity, complex decision making and is at high risk for complication, morbidity, and mortality.  I spent 53 minutes, reviewing the chart, obtaining and/or reviewing separately obtained history. Performing a medically appropriate examination and evaluation.  Counseling and educating the patient. Ordering and reviewing medications, tests, or procedures.  Discussing the case with cardiology.  Documenting clinical information in EPIC. Independently interpreting results and communicating results to patient. Discussing future disposition of care with patient, RN and case management.  This does not include time spent on separately billable procedures/tests.

## 2024-06-06 NOTE — PROGRESS NOTES
Valve Program Consultation: 6/6/2024 while inpatient     Mental Status Assessment:  Appearance: normal  Behavior: normal  Mood/Affect: normal, pleasant  Thought process/content: normal, pleasant  Cognition: normal  Functional ability: recent decline d/t AS symptoms; previously very active at home with dogs and horses  Dental Concerns: patient denies s/s of active oral infection  Further mental assessment needed: no    Post-op Plan of Care:  Support systems: patient alone during consultation, daughter Annie lives in town and patient will be staying with her for the first week post TAVR  Patient understands discharge plan: yes  DME: none  Home health warranted prior to procedure: no  Social concerns for discharge: none  PCP alerted of social concerns: n/a  POLST: none  Advance directive: none, provided patient with blank AD packet  Post-op goal: get back to baseline with walking. Initially was experiencing chest tightness with activity, has improved since being admitted.   Medication instructions: hold ramipril x24hrs     Concerns prior to procedure: Patient currently inpatient, will DC prior to TAVR     Met with patient during New TAVR consult.     All patient's questions and concerns were addressed during this visit. They understood pre-operative and post-operative plan of care.    Reviewed patient TAVR education packet explaining that information is provided regarding preparation for TAVR the night prior, what to expect during the hospital stay, average LOS, and what to look out for post TAVR discharge. Explained that patient will require SBE prophylactic antibiotic prior to any dental treatment post TAVR. Advised patient not to receive any new vaccinations within 1 week pre- and 1 week post-procedure.     Explained that patient should not eat or drink anything past midnight the day of the procedure. Encouraged patient to wear something clean and comfortable, easy to get on/off to check in Monday morning, time  TBD. Patient may have friends and family in the pre-operational area until patient is taken to the operating room, at which time family and friends will be asked to wait on floor 1 [facility] surgical waiting area. On completion of TAVR, heart team will update family. Once update is given, there is some time before family/friends may visit patient in their assigned room. Length of stay on average is one night, however patient may stay longer depending on specific needs at that time. Patient given printed instructions sheet with all above stated instructions. Patient states understanding of all material and education presented today and has no further questions at this time. Encouraged patient again, to contact me with any questions or concerns during this work-up process. Patient states understanding.

## 2024-06-06 NOTE — CONSULTS
Reason for Consult: Interventional cardiology consultation asked by Dr Elizabeth M.D. to see this patient with severe aortic stenosis  Patient's PCP: Olivia Jalloh P.A.-C.    CC: Shortness of breath      HPI: 88-year-old female patient presented with chest pain, shortness of breath gradually worsening, lifestyle-limiting.  She cannot able to walk without getting shortness of breath.  She was evaluated by echocardiogram, coronary angiogram showed severe aortic stenosis, moderate left main coronary artery disease    Medications / Drug list prior to admission:  No current facility-administered medications on file prior to encounter.     No current outpatient medications on file prior to encounter.       Current list of administered Medications:    Current Facility-Administered Medications:     ondansetron (Zofran) syringe/vial injection 4 mg, 4 mg, Intravenous, Q4HRS PRN, Isabela Giraldo D.N.P., 4 mg at 06/04/24 0250    enoxaparin (Lovenox) inj 40 mg, 40 mg, Subcutaneous, DAILY AT 1800, Karla Macias M.D., 40 mg at 06/05/24 1634    rosuvastatin (Crestor) tablet 20 mg, 20 mg, Oral, Q EVENING, Kb Wilde M.D., 20 mg at 06/05/24 1634    ramipril (Altace) capsule 5 mg, 5 mg, Oral, Q DAY, Kb Wilde M.D., 5 mg at 06/06/24 0507    History reviewed. No pertinent past medical history.    History reviewed. No pertinent surgical history.    History reviewed. No pertinent family history.  Patient family history was personally reviewed, no pertinent family history to current presentation    Social History     Tobacco Use    Smoking status: Never    Smokeless tobacco: Never       ALLERGIES:  Allergies   Allergen Reactions    Codeine        Review of systems:  A complete review of symptoms was reviewed with patient. This is reviewed in H&P and PMH. ALL OTHERS reviewed and negative    Physical exam:  Patient Vitals for the past 24 hrs:   BP Temp Temp src Pulse Resp SpO2 Weight   06/06/24 1540 (!) 152/65 36.7 °C (98.1 °F)  Temporal 93 16 92 % --   06/06/24 1122 (!) 161/81 36.7 °C (98.1 °F) Temporal 95 16 93 % --   06/06/24 0721 (!) 150/64 36.6 °C (97.9 °F) Temporal 85 20 90 % --   06/06/24 0400 131/81 36.9 °C (98.4 °F) Temporal 88 18 95 % 91.8 kg (202 lb 6.1 oz)   06/06/24 0000 128/55 36.7 °C (98.1 °F) Temporal 89 18 91 % --   06/05/24 2000 139/57 36.9 °C (98.4 °F) Temporal 87 18 90 % --     General: No acute distress.   EYES: no jaundice  HEENT: OP clear   Neck:  No JVD.   CVS:  RRR. S1 + S2.  3 x 6 systolic murmur  Resp: Normal respiratory effort, CTAB. No wheezing or crackles/rhonchi.  Abdomen: Soft, ND,  Skin: Grossly nothing acute no obvious rashes  Neurological: Alert, Moves all extremities  Extremities:   1+ edema. No cyanosis.       Data:  Laboratory studies personally reviewed by me:  No results found for this or any previous visit (from the past 24 hour(s)).    Imaging:  OUTSIDE IMAGES-DX CHEST   Final Result      CT-TAVR CHEST-ABD-PELVIS W/WO POST PROCESS W/AV SCORE   Final Result         1. A 5.5 cm solid mass in the lower pole left kidney, in keeping with renal cell carcinoma.      Additional 2.1 cm mass in the left adrenal gland, indeterminant but could be metastasis.      2. Right pulmonary nodules measuring up to 5 mm. These are indeterminant but could be metastasis given the renal mass.      3. Tricuspid aortic valve with moderate calcifications.      Annulus area: 404 mm?   Diameter at the level of the sinuses: 26.4 x 26.9 x 26.4 mm      Small distance from the aortic annulus to the coronary origin:   Left coronary ostium: 8.7 mm   Right coronary ostium 9.4 mm      4. Access evaluation:   Mild scattered calcification and low density plaque.   Measurement, minimal luminal diameter:      Right mid external iliac artery:   7.4 mm   Left mid external iliac artery: 7.3 mm      5. Bilateral pleural effusions with bibasilar atelectasis.      DX-CHEST-PORTABLE (1 VIEW)   Final Result         1.  Bilateral lower lobe  infiltrates, greater on the left.   2.  Small left pleural effusion   3.  Cardiomegaly   4.  Atherosclerosis      EC-ECHOCARDIOGRAM COMPLETE W/O CONT   Final Result      OUTSIDE IMAGES-DX CHEST   Final Result      OUTSIDE IMAGES-DX CHEST   Final Result      CL-LEFT HEART CATHETERIZATION WITH POSSIBLE INTERVENTION    (Results Pending)           EKG tracings personally reviewed by me sinus rhythm prolonged IN interval, right bundle branch block    Echocardiogram images personally reviewed by me show reviewed, independently interpreted, severe aortic valve stenosis    Ct chest   1. A 5.5 cm solid mass in the lower pole left kidney, in keeping with renal cell carcinoma.     Additional 2.1 cm mass in the left adrenal gland, indeterminant but could be metastasis.     2. Right pulmonary nodules measuring up to 5 mm. These are indeterminant but could be metastasis given the renal mass.     3. Tricuspid aortic valve with moderate calcifications.     Annulus area: 404 mm?  Diameter at the level of the sinuses: 26.4 x 26.9 x 26.4 mm     Small distance from the aortic annulus to the coronary origin:  Left coronary ostium: 8.7 mm  Right coronary ostium 9.4 mm     4. Access evaluation:  Mild scattered calcification and low density plaque.  Measurement, minimal luminal diameter:     Right mid external iliac artery:   7.4 mm  Left mid external iliac artery: 7.3 mm     5. Bilateral pleural effusions with bibasilar atelectasis.    All pertinent features of laboratory and imaging reviewed including primary images where applicable      Principal Problem:    NSTEMI (non-ST elevated myocardial infarction) (HCC) (POA: Yes)  Active Problems:    Primary hypertension (POA: Unknown)    Hyperlipidemia (POA: Unknown)    Severe aortic stenosis (POA: Unknown)  Resolved Problems:    * No resolved hospital problems. *      Assessment / Plan:  88-year-old female patient with severe symptomatic aortic stenosis, hyperlipidemia, hypertension NYHA class  III stage C.  Treatment options for aortic stenosis discussed.  Patient would like to proceed with transcatheter aortic valve replacement.  We reviewed coronary angiogram pictures at length, we felt left main stenosis is overall nonobstructive, we will continue to medically manage at this time.  Risk benefits of transcatheter aortic valve replacement discussed in detail.  She incidentally found to have kidney mass, discussed with hospital medicine physician Dr. Rowe, will consult with urology to get an opinion regarding treatment plan.    Case discussed with treating cardiologist     I personally discussed her case with  Dr Radha Rowe M.D.    No future appointments.    It is my pleasure to participate in the care of Ms. Ly.  Please do not hesitate to contact me with questions or concerns.    Markie Ibanez M.D.    6/6/2024

## 2024-06-06 NOTE — PROGRESS NOTES
Cardiology Follow Up Progress Note    Date of Service  6/6/2024    Attending Physician  Radha Rowe M.D.    Chief Complaint   Severe aortic stenosis, NSTEMI, coronary artery disease, history of hypertension and hyperlipidemia.     JAYA Ly is a 88 y.o. female admitted 6/2/2024 with above.    Interim Events  No significant changes noted from cardiac standpoint within the past 24 hours.     Review of Systems  Review of Systems   Constitutional: Negative.  Negative for chills and fever.   HENT: Negative.  Negative for hearing loss.    Eyes: Negative.    Respiratory: Negative.  Negative for cough and shortness of breath.    Cardiovascular: Negative.  Negative for chest pain, palpitations and leg swelling.   Gastrointestinal: Negative.  Negative for abdominal pain, nausea and vomiting.   Genitourinary: Negative.  Negative for dysuria and urgency.   Musculoskeletal: Negative.  Negative for myalgias.   Skin: Negative.  Negative for rash.   Neurological: Negative.  Negative for dizziness, weakness and headaches.   Hematological:  Does not bruise/bleed easily.   Psychiatric/Behavioral: Negative.  The patient is not nervous/anxious.        Vital signs in last 24 hours  Temp:  [36.5 °C (97.7 °F)-36.9 °C (98.4 °F)] 36.6 °C (97.9 °F)  Pulse:  [85-95] 85  Resp:  [16-20] 20  BP: (128-154)/(55-81) 150/64  SpO2:  [90 %-95 %] 90 %    Physical Exam  Physical Exam  Constitutional:       Appearance: Normal appearance. She is well-developed and normal weight.   HENT:      Head: Normocephalic and atraumatic.      Mouth/Throat:      Mouth: Mucous membranes are moist.   Eyes:      Extraocular Movements: Extraocular movements intact.      Conjunctiva/sclera: Conjunctivae normal.   Cardiovascular:      Rate and Rhythm: Normal rate and regular rhythm.      Pulses: Normal pulses.      Heart sounds: Murmur heard.   Pulmonary:      Effort: Pulmonary effort is normal.      Breath sounds: Normal breath sounds.   Abdominal:       General: Bowel sounds are normal.      Palpations: Abdomen is soft.   Musculoskeletal:         General: Normal range of motion.      Cervical back: Normal range of motion and neck supple.   Skin:     General: Skin is warm and dry.   Neurological:      General: No focal deficit present.      Mental Status: She is alert and oriented to person, place, and time. Mental status is at baseline.   Psychiatric:         Mood and Affect: Mood normal.         Behavior: Behavior normal.         Thought Content: Thought content normal.         Judgment: Judgment normal.         Lab Review  Lab Results   Component Value Date/Time    WBC 8.2 06/05/2024 12:37 AM    RBC 4.46 06/05/2024 12:37 AM    HEMOGLOBIN 12.6 06/05/2024 12:37 AM    HEMATOCRIT 38.2 06/05/2024 12:37 AM    MCV 85.7 06/05/2024 12:37 AM    MCH 28.3 06/05/2024 12:37 AM    MCHC 33.0 06/05/2024 12:37 AM    MPV 9.5 06/05/2024 12:37 AM      Lab Results   Component Value Date/Time    SODIUM 140 06/05/2024 12:37 AM    POTASSIUM 3.7 06/05/2024 12:37 AM    CHLORIDE 104 06/05/2024 12:37 AM    CO2 23 06/05/2024 12:37 AM    GLUCOSE 111 (H) 06/05/2024 12:37 AM    BUN 8 06/05/2024 12:37 AM    CREATININE 0.51 06/05/2024 12:37 AM      Lab Results   Component Value Date/Time    ASTSGOT 23 06/04/2024 03:00 AM    ALTSGPT 12 06/04/2024 03:00 AM     Lab Results   Component Value Date/Time    CHOLSTRLTOT 183 01/15/2021 07:46 AM     (H) 01/15/2021 07:46 AM    HDL 51 01/15/2021 07:46 AM    TRIGLYCERIDE 194 (H) 01/15/2021 07:46 AM    TROPONINT 169 (H) 06/04/2024 03:00 AM       Recent Labs     06/04/24  0300   NTPROBNP 6206*   (Above labs reviewed.)       Current Facility-Administered Medications:     ondansetron (Zofran) syringe/vial injection 4 mg, 4 mg, Intravenous, Q4HRS PRN, ROBERTO SykesNQueenieP., 4 mg at 06/04/24 0250    enoxaparin (Lovenox) inj 40 mg, 40 mg, Subcutaneous, DAILY AT 1800, Karla Macias M.D., 40 mg at 06/05/24 1634    rosuvastatin (Crestor) tablet 20 mg, 20 mg, Oral, Q  EVENING, Kb Wilde M.D., 20 mg at 06/05/24 1634    ramipril (Altace) capsule 5 mg, 5 mg, Oral, Q DAY, Kb Wilde M.D., 5 mg at 06/06/24 0507  (Medications reviewed.)    Cardiac Imaging and Procedures Review  CARDIAC STUDIES/PROCEDURES:     CARDIAC CATHETERIZATION CONCLUSIONS by Den Bello (06/04/24)  Relatively small and diffusely narrowed coronary vessels.  Approximately 50% distal left main coronary artery disease of unclear hemodynamic significance.  Mildly elevated left heart filling pressures.  Approximately 71 mmHg peak-to-peak transaortic gradient.  Normal ascending aorta diameter.     ECHOCARDIOGRAM CONCLUSIONS (06/03/24)  Normal left ventricular systolic function.    The ejection fraction is measured to be 59% by Mullen's biplane.   Mild concentric left ventricular hypertrophy. Grade I diastolic dysfunction.  The right ventricle is normal in size and systolic function.  Mild mitral regurgitation.  Severe aortic valve stenosis.   Vmax is 4.6 m/s. Transvalvular gradients are - Peak: 85 mmHg, Mean: 48 mmHg.  Unable to estimate right ventricular systolic pressure due to an   inadequate tricuspid regurgitant jet.  No prior study is available for comparison.   Primary team is notified of findings.     EKG performed on (06/02/24) EKG shows sinus rhythm with right bundle branch block.     Assessment/Plan  Severe aortic stenosis: She is clinically stable. She is scheduled for outpatient TAVR on Monday. She understands the risks and benefits and agrees with plan.   NSTEMI, coronary artery disease: She remains clinically doing well. I will continue with current medical care.  Health maintenance: She is from [place], [place]         Thank you for allowing me to participate in the care of this patient.  I will continue to follow this patient    Please contact me with any questions.    Jarrett Sweet M.D.   Cardiologist, [facility]  [phone]

## 2024-06-06 NOTE — PROGRESS NOTES
Valve Program Functional Assessment:     KCCQ12   1a) Showering/bathin  1b) Walking 1 block on ground: 4  1c) Hurrying or joggin  2) Swellin  3) Fatigue: 7  4) Shortness of breath: 6  5) Sleep sitting up: 5  6) Limited enjoyment of life: 4  7) Spend the rest of your life with HF: 1  8a) Hobbies, recreational activities:4  8b) Working or doing household chores:4  8c) Visiting family or friends: 6    5 meter walk test: Unable to walk     Strength   1) _30_____ kg  2) _20_____ kg  3) _25_____ kg  AVG:__25____    MERCHANT ADLs  Patient independently preforms...   - Bathing: Yes   - Dressing: Yes  - Toileting: Yes   - Transferring: Yes   - Continence: Yes   - Feeding: Yes   Total Score: _6_/6    Living Situation  Patient lives: alone    Mobility Aids   Patient uses: none      FRAILTY SCORE: _2_/ 4

## 2024-06-07 ENCOUNTER — DOCUMENTATION (OUTPATIENT)
Dept: CARDIOLOGY | Facility: MEDICAL CENTER | Age: 89
End: 2024-06-07
Payer: MEDICARE

## 2024-06-07 ENCOUNTER — PHARMACY VISIT (OUTPATIENT)
Dept: PHARMACY | Facility: MEDICAL CENTER | Age: 89
End: 2024-06-07
Payer: COMMERCIAL

## 2024-06-07 VITALS
BODY MASS INDEX: 33.43 KG/M2 | SYSTOLIC BLOOD PRESSURE: 152 MMHG | OXYGEN SATURATION: 92 % | WEIGHT: 200.62 LBS | RESPIRATION RATE: 18 BRPM | HEIGHT: 65 IN | HEART RATE: 90 BPM | DIASTOLIC BLOOD PRESSURE: 70 MMHG | TEMPERATURE: 98.6 F

## 2024-06-07 DIAGNOSIS — I35.0 SEVERE AORTIC STENOSIS: ICD-10-CM

## 2024-06-07 LAB
ANION GAP SERPL CALC-SCNC: 13 MMOL/L (ref 7–16)
BUN SERPL-MCNC: 10 MG/DL (ref 8–22)
CALCIUM SERPL-MCNC: 9 MG/DL (ref 8.5–10.5)
CHLORIDE SERPL-SCNC: 104 MMOL/L (ref 96–112)
CO2 SERPL-SCNC: 24 MMOL/L (ref 20–33)
CREAT SERPL-MCNC: 0.53 MG/DL (ref 0.5–1.4)
ERYTHROCYTE [DISTWIDTH] IN BLOOD BY AUTOMATED COUNT: 42.6 FL (ref 35.9–50)
GFR SERPLBLD CREATININE-BSD FMLA CKD-EPI: 90 ML/MIN/1.73 M 2
GLUCOSE SERPL-MCNC: 108 MG/DL (ref 65–99)
HCT VFR BLD AUTO: 41.9 % (ref 37–47)
HGB BLD-MCNC: 14 G/DL (ref 12–16)
MCH RBC QN AUTO: 28.2 PG (ref 27–33)
MCHC RBC AUTO-ENTMCNC: 33.4 G/DL (ref 32.2–35.5)
MCV RBC AUTO: 84.5 FL (ref 81.4–97.8)
PLATELET # BLD AUTO: 290 K/UL (ref 164–446)
PMV BLD AUTO: 9.3 FL (ref 9–12.9)
POTASSIUM SERPL-SCNC: 3.8 MMOL/L (ref 3.6–5.5)
RBC # BLD AUTO: 4.96 M/UL (ref 4.2–5.4)
SODIUM SERPL-SCNC: 141 MMOL/L (ref 135–145)
WBC # BLD AUTO: 8.3 K/UL (ref 4.8–10.8)

## 2024-06-07 PROCEDURE — 99233 SBSQ HOSP IP/OBS HIGH 50: CPT | Performed by: STUDENT IN AN ORGANIZED HEALTH CARE EDUCATION/TRAINING PROGRAM

## 2024-06-07 PROCEDURE — 80048 BASIC METABOLIC PNL TOTAL CA: CPT

## 2024-06-07 PROCEDURE — 700102 HCHG RX REV CODE 250 W/ 637 OVERRIDE(OP): Performed by: STUDENT IN AN ORGANIZED HEALTH CARE EDUCATION/TRAINING PROGRAM

## 2024-06-07 PROCEDURE — 99232 SBSQ HOSP IP/OBS MODERATE 35: CPT | Performed by: INTERNAL MEDICINE

## 2024-06-07 PROCEDURE — 85027 COMPLETE CBC AUTOMATED: CPT

## 2024-06-07 PROCEDURE — 99239 HOSP IP/OBS DSCHRG MGMT >30: CPT | Performed by: STUDENT IN AN ORGANIZED HEALTH CARE EDUCATION/TRAINING PROGRAM

## 2024-06-07 PROCEDURE — RXMED WILLOW AMBULATORY MEDICATION CHARGE: Performed by: STUDENT IN AN ORGANIZED HEALTH CARE EDUCATION/TRAINING PROGRAM

## 2024-06-07 PROCEDURE — A9270 NON-COVERED ITEM OR SERVICE: HCPCS | Performed by: STUDENT IN AN ORGANIZED HEALTH CARE EDUCATION/TRAINING PROGRAM

## 2024-06-07 RX ORDER — RAMIPRIL 5 MG/1
5 CAPSULE ORAL DAILY
Qty: 30 CAPSULE | Refills: 0 | Status: ON HOLD | OUTPATIENT
Start: 2024-06-08 | End: 2024-06-11

## 2024-06-07 RX ORDER — ROSUVASTATIN CALCIUM 20 MG/1
20 TABLET, COATED ORAL EVERY EVENING
Qty: 30 TABLET | Refills: 0 | Status: SHIPPED | OUTPATIENT
Start: 2024-06-07

## 2024-06-07 RX ADMIN — RAMIPRIL 5 MG: 5 CAPSULE ORAL at 05:03

## 2024-06-07 ASSESSMENT — FIBROSIS 4 INDEX: FIB4 SCORE: 2.36

## 2024-06-07 ASSESSMENT — ENCOUNTER SYMPTOMS
GASTROINTESTINAL NEGATIVE: 1
CHILLS: 0
HEADACHES: 0
PALPITATIONS: 0
RESPIRATORY NEGATIVE: 1
MUSCULOSKELETAL NEGATIVE: 1
CONSTITUTIONAL NEGATIVE: 1
SHORTNESS OF BREATH: 0
COUGH: 0
MYALGIAS: 0
NERVOUS/ANXIOUS: 0
NEUROLOGICAL NEGATIVE: 1
PSYCHIATRIC NEGATIVE: 1
VOMITING: 0
NAUSEA: 0
WEAKNESS: 0
DIZZINESS: 0
BRUISES/BLEEDS EASILY: 0
FEVER: 0
EYES NEGATIVE: 1
CARDIOVASCULAR NEGATIVE: 1
ABDOMINAL PAIN: 0

## 2024-06-07 NOTE — CARE PLAN
The patient is Stable - Low risk of patient condition declining or worsening    Shift Goals  Clinical Goals: promote sleep  Patient Goals: discharge  Family Goals: MARCOS    Progress made toward(s) clinical / shift goals:    Problem: Knowledge Deficit - Standard  Goal: Patient and family/care givers will demonstrate understanding of plan of care, disease process/condition, diagnostic tests and medications  Outcome: Progressing     Problem: Hemodynamics  Goal: Patient's hemodynamics, fluid balance and neurologic status will be stable or improve  Outcome: Progressing       Patient is not progressing towards the following goals:

## 2024-06-07 NOTE — PROGRESS NOTES
Report received from night RN at 0700. PT seen at bedside and pt care assumed. Pt is A&Ox4, on RA, denies pain. PIV flushed and intact. PT is SR on telemetry monitor. PT is up self.     Plan of care reviewed with pt, call light, phone, and personal belongings within reach. Bed alarm on, and bed in low locked position. All pt's needs met at this time.    Bedside report received from off going RN/tech: CON Romero, assumed care of patient.     Fall Risk Score: NO RISK  Fall risk interventions in place: Provide patient/family education based on risk assessment and Educate patient/family to call staff for assistance when getting out of bed  Bed type: Regular (Abel Score less than 17 interventions in place)  Patient on cardiac monitor: Yes  IVF/IV medications: Not Applicable   Oxygen: Room Air and No oxygen tank in room  Bedside sitter: Not Applicable   Isolation: Not applicable

## 2024-06-07 NOTE — DISCHARGE SUMMARY
Discharge Summary    CHIEF COMPLAINT ON ADMISSION  No chief complaint on file.      Reason for Admission  NSTEMI, CHF     Admission Date  6/2/2024    CODE STATUS  Full Code    HPI & HOSPITAL COURSE  This is a 88 y.o. female with history of hypertension and hyperlipidemia, who presented to her local ED in [place] due to chest pain and shortness of breath.  EKG showed sinus rhythm with some ST depressions diffusely however wandering baseline. Troponin was elevated at 953, with upper limit of normal at 45.  She was transferred here for higher level of care due to concerning NSTEMI.     Here cardiology was consulted. S/p LHC 6/3 Relatively small and diffusely narrowed coronary vessels. Approximately 50% distal left main coronary artery disease of unclear hemodynamic significance. 71 mmHg peak-to-peak transaortic gradient.   Echo notes severe AS. Patient was seen by cardiology and CTS,who recommended to TAVR.  Patient has scheduled outpatient TAVR with cardiology on 6/10.    There is an incidental finding about 5.5cm solid mass in the lower pole of left kidney, keeping with renal cell carcinoma.  Additional 2.1 cm mass in the left adrenal gland, indeterminant but could be metastasis.  Urology Dr. Tapia was consulted, who recommended outpatient urology follow-up.  I have placed outpatient urology referral ([facility]).    Therefore, she is discharged in good and stable condition to home with close outpatient follow-up.    The patient met 2-midnight criteria for an inpatient stay at the time of discharge.    Discharge Date  6/7/24    FOLLOW UP ITEMS POST DISCHARGE  PCP  cardiology  urology    DISCHARGE DIAGNOSES  Principal Problem:    NSTEMI (non-ST elevated myocardial infarction) (HCC) (POA: Yes)  Active Problems:    Primary hypertension (POA: Unknown)    Hyperlipidemia (POA: Unknown)    Severe aortic stenosis (POA: Unknown)    Renal mass (POA: Unknown)  Resolved Problems:    * No resolved hospital problems.  *      FOLLOW UP  No future appointments.  Olivia Jalloh P.A.-C.  [address] [address] [phone]    Follow up in 1 week(s)        MEDICATIONS ON DISCHARGE     Medication List        START taking these medications        Instructions   ramipril 5 MG Caps  Start taking on: June 8, 2024  Commonly known as: Altace   Take 1 Capsule by mouth every day for 30 days.  Dose: 5 mg     rosuvastatin 20 MG Tabs  Commonly known as: Crestor   Take 1 Tablet by mouth every evening.  Dose: 20 mg              Allergies  Allergies   Allergen Reactions    Codeine        DIET  Orders Placed This Encounter   Procedures    Diet Order Diet: Cardiac     Standing Status:   Standing     Number of Occurrences:   1     Order Specific Question:   Diet:     Answer:   Cardiac [6]       ACTIVITY  As tolerated.  Weight bearing as tolerated    CONSULTATIONS  Cardiology  urology    PROCEDURES  S/p [facility] 6/3    LABORATORY  Lab Results   Component Value Date    SODIUM 141 06/07/2024    POTASSIUM 3.8 06/07/2024    CHLORIDE 104 06/07/2024    CO2 24 06/07/2024    GLUCOSE 108 (H) 06/07/2024    BUN 10 06/07/2024    CREATININE 0.53 06/07/2024        Lab Results   Component Value Date    WBC 8.3 06/07/2024    HEMOGLOBIN 14.0 06/07/2024    HEMATOCRIT 41.9 06/07/2024    PLATELETCT 290 06/07/2024      OUTSIDE IMAGES-DX CHEST   Final Result      CT-TAVR CHEST-ABD-PELVIS W/WO POST PROCESS W/AV SCORE   Final Result         1. A 5.5 cm solid mass in the lower pole left kidney, in keeping with renal cell carcinoma.      Additional 2.1 cm mass in the left adrenal gland, indeterminant but could be metastasis.      2. Right pulmonary nodules measuring up to 5 mm. These are indeterminant but could be metastasis given the renal mass.      3. Tricuspid aortic valve with moderate calcifications.      Annulus area: 404 mm?   Diameter at the level of the sinuses: 26.4 x 26.9 x 26.4 mm      Small distance from the aortic annulus to the coronary origin:    Left coronary ostium: 8.7 mm   Right coronary ostium 9.4 mm      4. Access evaluation:   Mild scattered calcification and low density plaque.   Measurement, minimal luminal diameter:      Right mid external iliac artery:   7.4 mm   Left mid external iliac artery: 7.3 mm      5. Bilateral pleural effusions with bibasilar atelectasis.      DX-CHEST-PORTABLE (1 VIEW)   Final Result         1.  Bilateral lower lobe infiltrates, greater on the left.   2.  Small left pleural effusion   3.  Cardiomegaly   4.  Atherosclerosis      EC-ECHOCARDIOGRAM COMPLETE W/O CONT   Final Result      OUTSIDE IMAGES-DX CHEST   Final Result      OUTSIDE IMAGES-DX CHEST   Final Result      CL-LEFT HEART CATHETERIZATION WITH POSSIBLE INTERVENTION    (Results Pending)         Total time of the discharge process 35 minutes.

## 2024-06-07 NOTE — PROGRESS NOTES
[facility] TAVR review: /88F  Consider a 23 S3UR from a right femoral approach. Large Ostial LCA Calcium will interact with valve and balloon.  May want to implant deeper than 80/20 with 2 inflations (soft inflation then deflate and position lower then reinflation) within the same pacing run.  Greater vessels ostial calcium within the Transverse Arch  Moderate calcium in the distal aorta continuing into the iliacs bilaterally (Severe left common iliac calcification)  Slightly high left bifurcation  L33, Ca2

## 2024-06-07 NOTE — PROGRESS NOTES
Cardiology Follow Up Progress Note    Date of Service  6/7/2024    Attending Physician  Radha Rowe M.D.    Chief Complaint   Severe aortic stenosis, NSTEMI, coronary artery disease, history of hypertension and hyperlipidemia.     JAYA Ly is a 88 y.o. female admitted 6/2/2024 with above.    Interim Events  She was consulted by Markie Cruz for TAVR yesterday.     Review of Systems  Review of Systems   Constitutional: Negative.  Negative for chills and fever.   HENT: Negative.  Negative for hearing loss.    Eyes: Negative.    Respiratory: Negative.  Negative for cough and shortness of breath.    Cardiovascular: Negative.  Negative for chest pain, palpitations and leg swelling.   Gastrointestinal: Negative.  Negative for abdominal pain, nausea and vomiting.   Genitourinary: Negative.  Negative for dysuria and urgency.   Musculoskeletal: Negative.  Negative for myalgias.   Skin: Negative.  Negative for rash.   Neurological: Negative.  Negative for dizziness, weakness and headaches.   Hematological:  Does not bruise/bleed easily.   Psychiatric/Behavioral: Negative.  The patient is not nervous/anxious.        Vital signs in last 24 hours  Temp:  [36.5 °C (97.7 °F)-36.7 °C (98.1 °F)] 36.6 °C (97.9 °F)  Pulse:  [88-95] 88  Resp:  [16-18] 18  BP: (107-161)/(46-81) 150/80  SpO2:  [92 %-95 %] 95 %    Physical Exam  Physical Exam  Constitutional:       Appearance: Normal appearance. She is well-developed and normal weight.   HENT:      Head: Normocephalic and atraumatic.      Mouth/Throat:      Mouth: Mucous membranes are moist.   Eyes:      Extraocular Movements: Extraocular movements intact.      Conjunctiva/sclera: Conjunctivae normal.   Cardiovascular:      Rate and Rhythm: Normal rate and regular rhythm.      Pulses: Normal pulses.      Heart sounds: Murmur heard.   Pulmonary:      Effort: Pulmonary effort is normal.      Breath sounds: Normal breath sounds.   Abdominal:      General: Bowel  "sounds are normal.      Palpations: Abdomen is soft.   Musculoskeletal:         General: Normal range of motion.      Cervical back: Normal range of motion and neck supple.   Skin:     General: Skin is warm and dry.   Neurological:      General: No focal deficit present.      Mental Status: She is alert and oriented to person, place, and time. Mental status is at baseline.   Psychiatric:         Mood and Affect: Mood normal.         Behavior: Behavior normal.         Thought Content: Thought content normal.         Judgment: Judgment normal.         Lab Review  Lab Results   Component Value Date/Time    WBC 8.3 06/07/2024 04:08 AM    RBC 4.96 06/07/2024 04:08 AM    HEMOGLOBIN 14.0 06/07/2024 04:08 AM    HEMATOCRIT 41.9 06/07/2024 04:08 AM    MCV 84.5 06/07/2024 04:08 AM    MCH 28.2 06/07/2024 04:08 AM    MCHC 33.4 06/07/2024 04:08 AM    MPV 9.3 06/07/2024 04:08 AM      Lab Results   Component Value Date/Time    SODIUM 141 06/07/2024 04:08 AM    POTASSIUM 3.8 06/07/2024 04:08 AM    CHLORIDE 104 06/07/2024 04:08 AM    CO2 24 06/07/2024 04:08 AM    GLUCOSE 108 (H) 06/07/2024 04:08 AM    BUN 10 06/07/2024 04:08 AM    CREATININE 0.53 06/07/2024 04:08 AM      Lab Results   Component Value Date/Time    ASTSGOT 23 06/04/2024 03:00 AM    ALTSGPT 12 06/04/2024 03:00 AM     Lab Results   Component Value Date/Time    CHOLSTRLTOT 183 01/15/2021 07:46 AM     (H) 01/15/2021 07:46 AM    HDL 51 01/15/2021 07:46 AM    TRIGLYCERIDE 194 (H) 01/15/2021 07:46 AM    TROPONINT 169 (H) 06/04/2024 03:00 AM       No results for input(s): \"NTPROBNP\" in the last 72 hours.  (Above labs reviewed.)       Current Facility-Administered Medications:     ondansetron (Zofran) syringe/vial injection 4 mg, 4 mg, Intravenous, Q4HRS PRN, ROBERTO SykesN.P., 4 mg at 06/04/24 0250    enoxaparin (Lovenox) inj 40 mg, 40 mg, Subcutaneous, DAILY AT 1800, Karla Macias M.D., 40 mg at 06/06/24 1744    rosuvastatin (Crestor) tablet 20 mg, 20 mg, Oral, Q " EVENING, Kb Wilde M.D., 20 mg at 06/06/24 1744    ramipril (Altace) capsule 5 mg, 5 mg, Oral, Q DAY, Kb Wilde M.D., 5 mg at 06/07/24 0503  (Medications reviewed.)    Cardiac Imaging and Procedures Review  CARDIAC STUDIES/PROCEDURES:     CARDIAC CATHETERIZATION CONCLUSIONS by Den Bello (06/04/24)  Relatively small and diffusely narrowed coronary vessels.  Approximately 50% distal left main coronary artery disease of unclear hemodynamic significance.  Mildly elevated left heart filling pressures.  Approximately 71 mmHg peak-to-peak transaortic gradient.  Normal ascending aorta diameter.     ECHOCARDIOGRAM CONCLUSIONS (06/03/24)  Normal left ventricular systolic function.    The ejection fraction is measured to be 59% by Mullen's biplane.   Mild concentric left ventricular hypertrophy. Grade I diastolic dysfunction.  The right ventricle is normal in size and systolic function.  Mild mitral regurgitation.  Severe aortic valve stenosis.   Vmax is 4.6 m/s. Transvalvular gradients are - Peak: 85 mmHg, Mean: 48 mmHg.  Unable to estimate right ventricular systolic pressure due to an   inadequate tricuspid regurgitant jet.  No prior study is available for comparison.   Primary team is notified of findings.     EKG performed on (06/02/24) EKG shows sinus rhythm with right bundle branch block.     Assessment/Plan  Severe aortic stenosis, scheduled for outpatient TAVR on Monday: She is clinically doing well from valvular standpoint. She will be discharged to home today and return for procedure on Monday morning.   NSTEMI, coronary artery disease: She remains clinically doing well. I will continue with current medical care.  Health maintenance: She is from [place], [place]         Thank you for allowing me to participate in the care of this patient.  Cardiology will sign off on this patient    Please contact me with any questions.    Jarrett Sweet M.D.   Cardiologist, [facility]  [phone]

## 2024-06-07 NOTE — DISCHARGE INSTRUCTIONS
Discharge Instructions per Radha Rowe M.D.    Please follow-up with PCP as outpatient.  Continue taking medications as prescribed  Follow up with cardiology as outpatient  Follow up with urology Dr Tapia as outpatient   Return to ER in the event of new or worsening symptoms. Please note importance of compliance and the patient has agreed to proceed with all medical recommendations and follow up plan indicated above. All medications come with benefits and risks. Risks may include permanent injury or death and these risks can be minimized with close reassessment and monitoring. Please make it to your scheduled follow ups with PCP and urology and cardiology

## 2024-06-07 NOTE — CARE PLAN
Problem: Knowledge Deficit - Standard  Goal: Patient and family/care givers will demonstrate understanding of plan of care, disease process/condition, diagnostic tests and medications    Outcome: Progressing     Problem: Hemodynamics  Goal: Patient's hemodynamics, fluid balance and neurologic status will be stable or improve    Outcome: Progressing   The patient is Stable - Low risk of patient condition declining or worsening    Shift Goals  Clinical Goals: saefety  Patient Goals: know the plan  Family Goals: MARCOS    Progress made toward(s) clinical / shift goals:      Patient is not progressing towards the following goals:

## 2024-06-07 NOTE — PROGRESS NOTES
TAVR 6/10/2024    Check in time of 0530 6/10/2024.     Pre-op appointment completed: n/a    NPO after midnight. Hold ramipril x24hrs.     Patient notified of procedure date/time and check in process.     All questions were answered. Patient has direct extension for any further questions/concerns prior to the procedure.

## 2024-06-07 NOTE — PROGRESS NOTES
Bedside report received from off going RN/tech: Debra, assumed care of patient.     Fall Risk Score: NO RISK  Fall risk interventions in place: Provide patient/family education based on risk assessment  Bed type: Regular (Abel Score less than 17 interventions in place)  Patient on cardiac monitor: Yes  IVF/IV medications: Not Applicable   Oxygen: Room Air  Bedside sitter: Not Applicable   Isolation: Not applicable

## 2024-06-07 NOTE — PROGRESS NOTES
Pt discharged home to self care with outpatient procedure scheduled. Pt transferred to discharge [facility] for discharge processing. PT's telemetry monitor was removed. PT was fully clothed and stated she had all of her belongings with her. Pt was A&Ox4, VSS, and on room air.

## 2024-06-08 DIAGNOSIS — E27.8 ADRENAL MASS (HCC): ICD-10-CM

## 2024-06-08 ASSESSMENT — ENCOUNTER SYMPTOMS
CHILLS: 0
FLANK PAIN: 0
FEVER: 0

## 2024-06-09 NOTE — CONSULTS
Urology Consultation    Date of Service  6/8/2024    Referring Physician  No att. providers found    Consulting Physician  Brian Tapia M.D.    Reason for Consultation  Left renal mass    History of Presenting Illness  88 y.o. female who presented 6/2/2024 with possible NSTEMI and is currently undergo pre-op evaluation for TAVR. She was found on CT to have a left renal mass on the lower pole measuring 5.5 cm and a 2.1 cm left adrenal mass. She has not had any flank pain or gross hematuria. No changes in energy/appetite/weight.     Review of Systems  Review of Systems   Constitutional:  Negative for chills and fever.   Genitourinary:  Negative for flank pain and hematuria.       Past Medical History   has no past medical history on file.    Surgical History   has no past surgical history on file.    Family History  family history is not on file.    Social History   reports that she has never smoked. She has never used smokeless tobacco.    Medications  None       Allergies  Allergies   Allergen Reactions    Codeine        Physical Exam       Physical Exam  Constitutional:       Appearance: Normal appearance.   HENT:      Head: Normocephalic and atraumatic.   Pulmonary:      Effort: Pulmonary effort is normal.   Skin:     General: Skin is warm and dry.   Neurological:      General: No focal deficit present.      Mental Status: She is alert.   Psychiatric:         Mood and Affect: Mood normal.         Behavior: Behavior normal.         Fluids      Laboratory  Recent Labs     06/07/24  0408   WBC 8.3   RBC 4.96   HEMOGLOBIN 14.0   HEMATOCRIT 41.9   MCV 84.5   MCH 28.2   MCHC 33.4   RDW 42.6   PLATELETCT 290   MPV 9.3     Recent Labs     06/07/24  0408   SODIUM 141   POTASSIUM 3.8   CHLORIDE 104   CO2 24   GLUCOSE 108*   BUN 10   CREATININE 0.53   CALCIUM 9.0                     Imaging  OUTSIDE IMAGES-DX CHEST   Final Result      CT-TAVR CHEST-ABD-PELVIS W/WO POST PROCESS W/AV SCORE   Final Result         1. A 5.5 cm  solid mass in the lower pole left kidney, in keeping with renal cell carcinoma.      Additional 2.1 cm mass in the left adrenal gland, indeterminant but could be metastasis.      2. Right pulmonary nodules measuring up to 5 mm. These are indeterminant but could be metastasis given the renal mass.      3. Tricuspid aortic valve with moderate calcifications.      Annulus area: 404 mm?   Diameter at the level of the sinuses: 26.4 x 26.9 x 26.4 mm      Small distance from the aortic annulus to the coronary origin:   Left coronary ostium: 8.7 mm   Right coronary ostium 9.4 mm      4. Access evaluation:   Mild scattered calcification and low density plaque.   Measurement, minimal luminal diameter:      Right mid external iliac artery:   7.4 mm   Left mid external iliac artery: 7.3 mm      5. Bilateral pleural effusions with bibasilar atelectasis.      DX-CHEST-PORTABLE (1 VIEW)   Final Result         1.  Bilateral lower lobe infiltrates, greater on the left.   2.  Small left pleural effusion   3.  Cardiomegaly   4.  Atherosclerosis      EC-ECHOCARDIOGRAM COMPLETE W/O CONT   Final Result      OUTSIDE IMAGES-DX CHEST   Final Result      OUTSIDE IMAGES-DX CHEST   Final Result      CL-LEFT HEART CATHETERIZATION WITH POSSIBLE INTERVENTION    (Results Pending)       Assessment/Plan  Mrs. Ly is an 88 year old woman scheduled to undergo TAVR on 6/10/2024. She was found to have a 5.5 cm left lower pole renal mass as well as a 2.1 cm left adrenal mass which is indeterminate on the CTA. I recommend that she proceed with the TAVR and undergo left partial nephrectomy once she is safe to hold anti-coagulation. I will order a CT adrenal mass protocol as well as a functional workup to determine if the adrenal mass is benign or malignant and if it is functional.     Outpatient follow up after TAVR  CT adrenal mass protocol   Functional workup after recovery for adrenal mass    Service: Urology

## 2024-06-10 ENCOUNTER — APPOINTMENT (OUTPATIENT)
Dept: RADIOLOGY | Facility: MEDICAL CENTER | Age: 89
DRG: 267 | End: 2024-06-10
Attending: NURSE PRACTITIONER
Payer: MEDICARE

## 2024-06-10 ENCOUNTER — HOSPITAL ENCOUNTER (INPATIENT)
Facility: MEDICAL CENTER | Age: 89
LOS: 1 days | DRG: 267 | End: 2024-06-11
Attending: INTERNAL MEDICINE | Admitting: INTERNAL MEDICINE
Payer: MEDICARE

## 2024-06-10 ENCOUNTER — ANESTHESIA EVENT (OUTPATIENT)
Dept: SURGERY | Facility: MEDICAL CENTER | Age: 89
DRG: 267 | End: 2024-06-10
Payer: MEDICARE

## 2024-06-10 ENCOUNTER — APPOINTMENT (OUTPATIENT)
Dept: CARDIOLOGY | Facility: MEDICAL CENTER | Age: 89
DRG: 267 | End: 2024-06-10
Attending: ANESTHESIOLOGY
Payer: MEDICARE

## 2024-06-10 ENCOUNTER — ANESTHESIA (OUTPATIENT)
Dept: SURGERY | Facility: MEDICAL CENTER | Age: 89
DRG: 267 | End: 2024-06-10
Payer: MEDICARE

## 2024-06-10 DIAGNOSIS — Z95.2 S/P TAVR (TRANSCATHETER AORTIC VALVE REPLACEMENT): ICD-10-CM

## 2024-06-10 DIAGNOSIS — I45.10 RBBB: ICD-10-CM

## 2024-06-10 DIAGNOSIS — I10 PRIMARY HYPERTENSION: ICD-10-CM

## 2024-06-10 PROBLEM — I21.4 NSTEMI (NON-ST ELEVATED MYOCARDIAL INFARCTION) (HCC): Status: RESOLVED | Noted: 2024-06-02 | Resolved: 2024-06-10

## 2024-06-10 PROBLEM — I25.10 CORONARY ARTERY DISEASE INVOLVING NATIVE CORONARY ARTERY OF NATIVE HEART WITHOUT ANGINA PECTORIS: Status: ACTIVE | Noted: 2024-06-10

## 2024-06-10 PROBLEM — I35.0 SEVERE AORTIC STENOSIS: Status: RESOLVED | Noted: 2024-06-04 | Resolved: 2024-06-10

## 2024-06-10 LAB
ABO + RH BLD: NORMAL
ABO GROUP BLD: NORMAL
ALBUMIN SERPL BCP-MCNC: 3.8 G/DL (ref 3.2–4.9)
ALBUMIN/GLOB SERPL: 1.5 G/DL
ALP SERPL-CCNC: 68 U/L (ref 30–99)
ALT SERPL-CCNC: 20 U/L (ref 2–50)
ANION GAP SERPL CALC-SCNC: 14 MMOL/L (ref 7–16)
AST SERPL-CCNC: 25 U/L (ref 12–45)
BILIRUB SERPL-MCNC: 0.4 MG/DL (ref 0.1–1.5)
BLD GP AB SCN SERPL QL: NORMAL
BUN SERPL-MCNC: 10 MG/DL (ref 8–22)
CALCIUM ALBUM COR SERPL-MCNC: 8.8 MG/DL (ref 8.5–10.5)
CALCIUM SERPL-MCNC: 8.6 MG/DL (ref 8.5–10.5)
CHLORIDE SERPL-SCNC: 106 MMOL/L (ref 96–112)
CO2 SERPL-SCNC: 20 MMOL/L (ref 20–33)
CREAT SERPL-MCNC: 0.58 MG/DL (ref 0.5–1.4)
EKG IMPRESSION: NORMAL
ERYTHROCYTE [DISTWIDTH] IN BLOOD BY AUTOMATED COUNT: 42.6 FL (ref 35.9–50)
GFR SERPLBLD CREATININE-BSD FMLA CKD-EPI: 88 ML/MIN/1.73 M 2
GLOBULIN SER CALC-MCNC: 2.6 G/DL (ref 1.9–3.5)
GLUCOSE SERPL-MCNC: 128 MG/DL (ref 65–99)
HCT VFR BLD AUTO: 41 % (ref 37–47)
HGB BLD-MCNC: 13.8 G/DL (ref 12–16)
MCH RBC QN AUTO: 28.1 PG (ref 27–33)
MCHC RBC AUTO-ENTMCNC: 33.7 G/DL (ref 32.2–35.5)
MCV RBC AUTO: 83.5 FL (ref 81.4–97.8)
NT-PROBNP SERPL IA-MCNC: 2754 PG/ML (ref 0–125)
PLATELET # BLD AUTO: 256 K/UL (ref 164–446)
PMV BLD AUTO: 9.3 FL (ref 9–12.9)
POTASSIUM SERPL-SCNC: 3.7 MMOL/L (ref 3.6–5.5)
PROT SERPL-MCNC: 6.4 G/DL (ref 6–8.2)
RBC # BLD AUTO: 4.91 M/UL (ref 4.2–5.4)
RH BLD: NORMAL
SODIUM SERPL-SCNC: 140 MMOL/L (ref 135–145)
WBC # BLD AUTO: 10.9 K/UL (ref 4.8–10.8)

## 2024-06-10 PROCEDURE — 160031 HCHG SURGERY MINUTES - 1ST 30 MINS LEVEL 5: Performed by: INTERNAL MEDICINE

## 2024-06-10 PROCEDURE — 700101 HCHG RX REV CODE 250: Performed by: INTERNAL MEDICINE

## 2024-06-10 PROCEDURE — 86901 BLOOD TYPING SEROLOGIC RH(D): CPT

## 2024-06-10 PROCEDURE — 503001 HCHG PERFUSION: Performed by: INTERNAL MEDICINE

## 2024-06-10 PROCEDURE — 86850 RBC ANTIBODY SCREEN: CPT

## 2024-06-10 PROCEDURE — C1883 ADAPT/EXT, PACING/NEURO LEAD: HCPCS | Performed by: INTERNAL MEDICINE

## 2024-06-10 PROCEDURE — 33361 REPLACE AORTIC VALVE PERQ: CPT | Mod: 62,Q0 | Performed by: INTERNAL MEDICINE

## 2024-06-10 PROCEDURE — 160048 HCHG OR STATISTICAL LEVEL 1-5: Performed by: INTERNAL MEDICINE

## 2024-06-10 PROCEDURE — 700105 HCHG RX REV CODE 258: Performed by: INTERNAL MEDICINE

## 2024-06-10 PROCEDURE — 700111 HCHG RX REV CODE 636 W/ 250 OVERRIDE (IP): Mod: JZ | Performed by: NURSE PRACTITIONER

## 2024-06-10 PROCEDURE — C1894 INTRO/SHEATH, NON-LASER: HCPCS | Performed by: INTERNAL MEDICINE

## 2024-06-10 PROCEDURE — 700111 HCHG RX REV CODE 636 W/ 250 OVERRIDE (IP): Performed by: ANESTHESIOLOGY

## 2024-06-10 PROCEDURE — B3101ZZ FLUOROSCOPY OF THORACIC AORTA USING LOW OSMOLAR CONTRAST: ICD-10-PCS | Performed by: INTERNAL MEDICINE

## 2024-06-10 PROCEDURE — 160009 HCHG ANES TIME/MIN: Performed by: INTERNAL MEDICINE

## 2024-06-10 PROCEDURE — 93005 ELECTROCARDIOGRAM TRACING: CPT | Performed by: NURSE PRACTITIONER

## 2024-06-10 PROCEDURE — 80053 COMPREHEN METABOLIC PANEL: CPT

## 2024-06-10 PROCEDURE — 700111 HCHG RX REV CODE 636 W/ 250 OVERRIDE (IP): Mod: JG | Performed by: INTERNAL MEDICINE

## 2024-06-10 PROCEDURE — 770000 HCHG ROOM/CARE - INTERMEDIATE ICU *

## 2024-06-10 PROCEDURE — 110372 HCHG SHELL REV 278: Performed by: INTERNAL MEDICINE

## 2024-06-10 PROCEDURE — 700102 HCHG RX REV CODE 250 W/ 637 OVERRIDE(OP): Performed by: NURSE PRACTITIONER

## 2024-06-10 PROCEDURE — C1769 GUIDE WIRE: HCPCS | Performed by: INTERNAL MEDICINE

## 2024-06-10 PROCEDURE — A9270 NON-COVERED ITEM OR SERVICE: HCPCS | Performed by: NURSE PRACTITIONER

## 2024-06-10 PROCEDURE — 700117 HCHG RX CONTRAST REV CODE 255: Performed by: INTERNAL MEDICINE

## 2024-06-10 PROCEDURE — 86900 BLOOD TYPING SEROLOGIC ABO: CPT

## 2024-06-10 PROCEDURE — 33361 REPLACE AORTIC VALVE PERQ: CPT | Mod: 62,Q0 | Performed by: THORACIC SURGERY (CARDIOTHORACIC VASCULAR SURGERY)

## 2024-06-10 PROCEDURE — 85027 COMPLETE CBC AUTOMATED: CPT

## 2024-06-10 PROCEDURE — 160042 HCHG SURGERY MINUTES - EA ADDL 1 MIN LEVEL 5: Performed by: INTERNAL MEDICINE

## 2024-06-10 PROCEDURE — 71045 X-RAY EXAM CHEST 1 VIEW: CPT

## 2024-06-10 PROCEDURE — C1887 CATHETER, GUIDING: HCPCS | Performed by: INTERNAL MEDICINE

## 2024-06-10 PROCEDURE — 02RF38Z REPLACEMENT OF AORTIC VALVE WITH ZOOPLASTIC TISSUE, PERCUTANEOUS APPROACH: ICD-10-PCS | Performed by: THORACIC SURGERY (CARDIOTHORACIC VASCULAR SURGERY)

## 2024-06-10 PROCEDURE — 93355 ECHO TRANSESOPHAGEAL (TEE): CPT

## 2024-06-10 PROCEDURE — 85347 COAGULATION TIME ACTIVATED: CPT

## 2024-06-10 PROCEDURE — 700101 HCHG RX REV CODE 250: Performed by: ANESTHESIOLOGY

## 2024-06-10 PROCEDURE — 83880 ASSAY OF NATRIURETIC PEPTIDE: CPT

## 2024-06-10 PROCEDURE — 700105 HCHG RX REV CODE 258: Performed by: NURSE PRACTITIONER

## 2024-06-10 PROCEDURE — 93010 ELECTROCARDIOGRAM REPORT: CPT | Performed by: INTERNAL MEDICINE

## 2024-06-10 PROCEDURE — C1760 CLOSURE DEV, VASC: HCPCS | Performed by: INTERNAL MEDICINE

## 2024-06-10 PROCEDURE — C1751 CATH, INF, PER/CENT/MIDLINE: HCPCS | Performed by: INTERNAL MEDICINE

## 2024-06-10 DEVICE — IMPLANTABLE DEVICE: Type: IMPLANTABLE DEVICE | Site: HEART | Status: FUNCTIONAL

## 2024-06-10 RX ORDER — CEFAZOLIN SODIUM 1 G/3ML
INJECTION, POWDER, FOR SOLUTION INTRAMUSCULAR; INTRAVENOUS PRN
Status: DISCONTINUED | OUTPATIENT
Start: 2024-06-10 | End: 2024-06-10 | Stop reason: SURG

## 2024-06-10 RX ORDER — OXYCODONE HCL 5 MG/5 ML
5 SOLUTION, ORAL ORAL
Status: DISCONTINUED | OUTPATIENT
Start: 2024-06-10 | End: 2024-06-11

## 2024-06-10 RX ORDER — POLYETHYLENE GLYCOL 3350 17 G/17G
1 POWDER, FOR SOLUTION ORAL 2 TIMES DAILY PRN
Status: DISCONTINUED | OUTPATIENT
Start: 2024-06-10 | End: 2024-06-11 | Stop reason: HOSPADM

## 2024-06-10 RX ORDER — METOPROLOL SUCCINATE 100 MG/1
100 TABLET, EXTENDED RELEASE ORAL DAILY
Status: ON HOLD | COMMUNITY
Start: 2024-05-28 | End: 2024-06-11

## 2024-06-10 RX ORDER — ONDANSETRON 2 MG/ML
4 INJECTION INTRAMUSCULAR; INTRAVENOUS EVERY 4 HOURS PRN
Status: DISCONTINUED | OUTPATIENT
Start: 2024-06-10 | End: 2024-06-10

## 2024-06-10 RX ORDER — ENEMA 19; 7 G/133ML; G/133ML
1 ENEMA RECTAL
Status: DISCONTINUED | OUTPATIENT
Start: 2024-06-10 | End: 2024-06-11 | Stop reason: HOSPADM

## 2024-06-10 RX ORDER — AMOXICILLIN 250 MG
1 CAPSULE ORAL
Status: DISCONTINUED | OUTPATIENT
Start: 2024-06-10 | End: 2024-06-11 | Stop reason: HOSPADM

## 2024-06-10 RX ORDER — PROTAMINE SULFATE 10 MG/ML
INJECTION, SOLUTION INTRAVENOUS PRN
Status: DISCONTINUED | OUTPATIENT
Start: 2024-06-10 | End: 2024-06-10 | Stop reason: SURG

## 2024-06-10 RX ORDER — LIDOCAINE HYDROCHLORIDE 20 MG/ML
INJECTION, SOLUTION INFILTRATION; PERINEURAL
Status: DISCONTINUED | OUTPATIENT
Start: 2024-06-10 | End: 2024-06-10 | Stop reason: HOSPADM

## 2024-06-10 RX ORDER — SODIUM CHLORIDE, SODIUM LACTATE, POTASSIUM CHLORIDE, CALCIUM CHLORIDE 600; 310; 30; 20 MG/100ML; MG/100ML; MG/100ML; MG/100ML
INJECTION, SOLUTION INTRAVENOUS CONTINUOUS
Status: ACTIVE | OUTPATIENT
Start: 2024-06-10 | End: 2024-06-10

## 2024-06-10 RX ORDER — ROSUVASTATIN CALCIUM 10 MG/1
20 TABLET, COATED ORAL EVERY EVENING
Status: DISCONTINUED | OUTPATIENT
Start: 2024-06-10 | End: 2024-06-11 | Stop reason: HOSPADM

## 2024-06-10 RX ORDER — SODIUM CHLORIDE, SODIUM LACTATE, POTASSIUM CHLORIDE, CALCIUM CHLORIDE 600; 310; 30; 20 MG/100ML; MG/100ML; MG/100ML; MG/100ML
INJECTION, SOLUTION INTRAVENOUS CONTINUOUS
Status: DISCONTINUED | OUTPATIENT
Start: 2024-06-10 | End: 2024-06-10

## 2024-06-10 RX ORDER — DOCUSATE SODIUM 100 MG/1
100 CAPSULE, LIQUID FILLED ORAL 2 TIMES DAILY
Status: DISCONTINUED | OUTPATIENT
Start: 2024-06-10 | End: 2024-06-11 | Stop reason: HOSPADM

## 2024-06-10 RX ORDER — BUPIVACAINE HYDROCHLORIDE 2.5 MG/ML
INJECTION, SOLUTION EPIDURAL; INFILTRATION; INTRACAUDAL
Status: DISCONTINUED | OUTPATIENT
Start: 2024-06-10 | End: 2024-06-10 | Stop reason: HOSPADM

## 2024-06-10 RX ORDER — LATANOPROST 50 UG/ML
1 SOLUTION/ DROPS OPHTHALMIC NIGHTLY
Status: DISCONTINUED | OUTPATIENT
Start: 2024-06-10 | End: 2024-06-11 | Stop reason: HOSPADM

## 2024-06-10 RX ORDER — ACETAMINOPHEN 325 MG/1
650 TABLET ORAL EVERY 6 HOURS PRN
Status: DISCONTINUED | OUTPATIENT
Start: 2024-06-10 | End: 2024-06-11 | Stop reason: HOSPADM

## 2024-06-10 RX ORDER — BISACODYL 10 MG
10 SUPPOSITORY, RECTAL RECTAL
Status: DISCONTINUED | OUTPATIENT
Start: 2024-06-10 | End: 2024-06-11 | Stop reason: HOSPADM

## 2024-06-10 RX ORDER — DIPHENHYDRAMINE HYDROCHLORIDE 50 MG/ML
12.5 INJECTION INTRAMUSCULAR; INTRAVENOUS
Status: DISCONTINUED | OUTPATIENT
Start: 2024-06-10 | End: 2024-06-10

## 2024-06-10 RX ORDER — POTASSIUM CHLORIDE 20 MEQ/1
20 TABLET, EXTENDED RELEASE ORAL ONCE
Status: COMPLETED | OUTPATIENT
Start: 2024-06-10 | End: 2024-06-10

## 2024-06-10 RX ORDER — OXYCODONE HCL 5 MG/5 ML
10 SOLUTION, ORAL ORAL
Status: DISCONTINUED | OUTPATIENT
Start: 2024-06-10 | End: 2024-06-11

## 2024-06-10 RX ORDER — AMLODIPINE BESYLATE 10 MG/1
10 TABLET ORAL DAILY
Status: DISCONTINUED | OUTPATIENT
Start: 2024-06-10 | End: 2024-06-11 | Stop reason: HOSPADM

## 2024-06-10 RX ORDER — ONDANSETRON 2 MG/ML
4 INJECTION INTRAMUSCULAR; INTRAVENOUS EVERY 4 HOURS PRN
Status: DISCONTINUED | OUTPATIENT
Start: 2024-06-10 | End: 2024-06-11 | Stop reason: HOSPADM

## 2024-06-10 RX ORDER — AMLODIPINE BESYLATE 10 MG/1
10 TABLET ORAL DAILY
COMMUNITY
Start: 2024-04-08

## 2024-06-10 RX ORDER — DIPHENHYDRAMINE HYDROCHLORIDE 50 MG/ML
25 INJECTION INTRAMUSCULAR; INTRAVENOUS EVERY 6 HOURS PRN
Status: DISCONTINUED | OUTPATIENT
Start: 2024-06-10 | End: 2024-06-11 | Stop reason: HOSPADM

## 2024-06-10 RX ORDER — ONDANSETRON 2 MG/ML
4 INJECTION INTRAMUSCULAR; INTRAVENOUS
Status: DISCONTINUED | OUTPATIENT
Start: 2024-06-10 | End: 2024-06-10

## 2024-06-10 RX ORDER — HYDRALAZINE HYDROCHLORIDE 20 MG/ML
10 INJECTION INTRAMUSCULAR; INTRAVENOUS
Status: DISCONTINUED | OUTPATIENT
Start: 2024-06-10 | End: 2024-06-11 | Stop reason: HOSPADM

## 2024-06-10 RX ORDER — DIPHENHYDRAMINE HCL 25 MG
25 TABLET ORAL NIGHTLY PRN
Status: DISCONTINUED | OUTPATIENT
Start: 2024-06-10 | End: 2024-06-11 | Stop reason: HOSPADM

## 2024-06-10 RX ORDER — AMOXICILLIN 250 MG
1 CAPSULE ORAL NIGHTLY
Status: DISCONTINUED | OUTPATIENT
Start: 2024-06-10 | End: 2024-06-11 | Stop reason: HOSPADM

## 2024-06-10 RX ORDER — LATANOPROST 50 UG/ML
1 SOLUTION/ DROPS OPHTHALMIC NIGHTLY
COMMUNITY

## 2024-06-10 RX ORDER — HYDROCHLOROTHIAZIDE 25 MG/1
25 TABLET ORAL DAILY
Status: ON HOLD | COMMUNITY
Start: 2024-03-15 | End: 2024-06-10

## 2024-06-10 RX ORDER — SODIUM CHLORIDE 9 MG/ML
INJECTION, SOLUTION INTRAVENOUS CONTINUOUS
Status: ACTIVE | OUTPATIENT
Start: 2024-06-10 | End: 2024-06-10

## 2024-06-10 RX ORDER — ONDANSETRON 2 MG/ML
INJECTION INTRAMUSCULAR; INTRAVENOUS PRN
Status: DISCONTINUED | OUTPATIENT
Start: 2024-06-10 | End: 2024-06-10

## 2024-06-10 RX ORDER — HEPARIN SODIUM,PORCINE 1000/ML
VIAL (ML) INJECTION PRN
Status: DISCONTINUED | OUTPATIENT
Start: 2024-06-10 | End: 2024-06-10 | Stop reason: SURG

## 2024-06-10 RX ORDER — ASPIRIN 81 MG/1
81 TABLET ORAL DAILY
Status: DISCONTINUED | OUTPATIENT
Start: 2024-06-10 | End: 2024-06-11 | Stop reason: HOSPADM

## 2024-06-10 RX ORDER — RAMIPRIL 5 MG/1
10 CAPSULE ORAL DAILY
Status: DISCONTINUED | OUTPATIENT
Start: 2024-06-10 | End: 2024-06-11 | Stop reason: HOSPADM

## 2024-06-10 RX ORDER — FUROSEMIDE 10 MG/ML
20 INJECTION INTRAMUSCULAR; INTRAVENOUS ONCE
Status: COMPLETED | OUTPATIENT
Start: 2024-06-10 | End: 2024-06-10

## 2024-06-10 RX ORDER — DEXAMETHASONE SODIUM PHOSPHATE 4 MG/ML
INJECTION, SOLUTION INTRA-ARTICULAR; INTRALESIONAL; INTRAMUSCULAR; INTRAVENOUS; SOFT TISSUE PRN
Status: DISCONTINUED | OUTPATIENT
Start: 2024-06-10 | End: 2024-06-10 | Stop reason: SURG

## 2024-06-10 RX ORDER — LIDOCAINE HYDROCHLORIDE 40 MG/ML
SOLUTION TOPICAL PRN
Status: DISCONTINUED | OUTPATIENT
Start: 2024-06-10 | End: 2024-06-10 | Stop reason: SURG

## 2024-06-10 RX ADMIN — SUGAMMADEX 200 MG: 100 INJECTION, SOLUTION INTRAVENOUS at 08:45

## 2024-06-10 RX ADMIN — DOCUSATE SODIUM 100 MG: 100 CAPSULE, LIQUID FILLED ORAL at 17:20

## 2024-06-10 RX ADMIN — AMLODIPINE BESYLATE 10 MG: 10 TABLET ORAL at 09:57

## 2024-06-10 RX ADMIN — HYDRALAZINE HYDROCHLORIDE 10 MG: 20 INJECTION INTRAMUSCULAR; INTRAVENOUS at 09:56

## 2024-06-10 RX ADMIN — HEPARIN SODIUM 10000 UNITS: 1000 INJECTION, SOLUTION INTRAVENOUS; SUBCUTANEOUS at 08:20

## 2024-06-10 RX ADMIN — PROTAMINE SULFATE 50 MG: 10 INJECTION, SOLUTION INTRAVENOUS at 08:36

## 2024-06-10 RX ADMIN — HYDRALAZINE HYDROCHLORIDE 10 MG: 20 INJECTION INTRAMUSCULAR; INTRAVENOUS at 09:18

## 2024-06-10 RX ADMIN — DEXAMETHASONE SODIUM PHOSPHATE 4 MG: 4 INJECTION INTRA-ARTICULAR; INTRALESIONAL; INTRAMUSCULAR; INTRAVENOUS; SOFT TISSUE at 08:18

## 2024-06-10 RX ADMIN — ROCURONIUM BROMIDE 50 MG: 10 INJECTION, SOLUTION INTRAVENOUS at 07:42

## 2024-06-10 RX ADMIN — LIDOCAINE HYDROCHLORIDE 4 ML: 40 SOLUTION TOPICAL at 07:43

## 2024-06-10 RX ADMIN — RAMIPRIL 10 MG: 5 CAPSULE ORAL at 10:42

## 2024-06-10 RX ADMIN — SODIUM CHLORIDE, POTASSIUM CHLORIDE, SODIUM LACTATE AND CALCIUM CHLORIDE: 600; 310; 30; 20 INJECTION, SOLUTION INTRAVENOUS at 07:38

## 2024-06-10 RX ADMIN — POTASSIUM CHLORIDE 20 MEQ: 1500 TABLET, EXTENDED RELEASE ORAL at 13:26

## 2024-06-10 RX ADMIN — ONDANSETRON 4 MG: 2 INJECTION INTRAMUSCULAR; INTRAVENOUS at 08:18

## 2024-06-10 RX ADMIN — PROPOFOL 50 MG: 10 INJECTION, EMULSION INTRAVENOUS at 08:25

## 2024-06-10 RX ADMIN — ROSUVASTATIN 20 MG: 10 TABLET, FILM COATED ORAL at 17:20

## 2024-06-10 RX ADMIN — PROPOFOL 150 MG: 10 INJECTION, EMULSION INTRAVENOUS at 07:42

## 2024-06-10 RX ADMIN — ASPIRIN 81 MG: 81 TABLET, COATED ORAL at 09:57

## 2024-06-10 RX ADMIN — CEFAZOLIN 2 G: 1 INJECTION, POWDER, FOR SOLUTION INTRAMUSCULAR; INTRAVENOUS at 07:50

## 2024-06-10 RX ADMIN — SODIUM CHLORIDE: 9 INJECTION, SOLUTION INTRAVENOUS at 09:34

## 2024-06-10 RX ADMIN — LATANOPROST 1 DROP: 50 SOLUTION OPHTHALMIC at 22:32

## 2024-06-10 RX ADMIN — FUROSEMIDE 20 MG: 10 INJECTION, SOLUTION INTRAVENOUS at 13:26

## 2024-06-10 ASSESSMENT — PAIN DESCRIPTION - PAIN TYPE
TYPE: ACUTE PAIN

## 2024-06-10 ASSESSMENT — FIBROSIS 4 INDEX: FIB4 SCORE: 1.97

## 2024-06-10 NOTE — ANESTHESIA PROCEDURE NOTES
Arterial Line    Performed by: Harjeet Thakur M.D.  Authorized by: Harjeet Thakur M.D.    Start Time:  6/10/2024 7:47 AM  End Time:  6/10/2024 7:48 AM  Localization: surface landmarks    Patient Location:  OR  Indication: continuous blood pressure monitoring        Catheter Size:  20 G  Seldinger Technique?: Yes    Laterality:  Left  Site:  Radial artery  Line Secured:  Antimicrobial disc, tape and transparent dressing  Events: patient tolerated procedure well with no complications

## 2024-06-10 NOTE — ANESTHESIA PROCEDURE NOTES
Airway    Date/Time: 6/10/2024 7:43 AM    Performed by: Harjeet Thakur M.D.  Authorized by: Harjeet Thakur M.D.    Location:  OR  Urgency:  Elective  Difficult Airway: No    Indications for Airway Management:  Anesthesia      Spontaneous Ventilation: absent    Sedation Level:  Deep  Preoxygenated: Yes    Patient Position:  Sniffing  Mask Difficulty Assessment:  0 - not attempted  Final Airway Type:  Endotracheal airway  Final Endotracheal Airway:  ETT  Cuffed: Yes    Technique Used for Successful ETT Placement:  Direct laryngoscopy    Insertion Site:  Oral  Blade Type:  Thien  Laryngoscope Blade/Videolaryngoscope Blade Size:  3  ETT Size (mm):  7.0  Measured from:  Teeth  ETT to Teeth (cm):  22  Placement Verified by: auscultation and capnometry    Cormack-Lehane Classification:  Grade I - full view of glottis  Number of Attempts at Approach:  1

## 2024-06-10 NOTE — ANESTHESIA PREPROCEDURE EVALUATION
Case: [id number] Date/Time: 06/10/24 0715    Procedures:       TRANSCATHETER AORTIC VALVE REPLACEMENT (Bilateral: Groin) - Site also includes right wrist, site clean, closure N/A      ECHOCARDIOGRAM, TRANSESOPHAGEAL, INTRAOPERATIVE (Throat)      INSERTION, CARDIAC PACEMAKER, TEMPORARY (Right: Neck)    Anesthesia type: General    Pre-op diagnosis: SEVERE AORTIC STENOSIS    Location: [place] [address] / SURGERY [facility]    Surgeons: Markie Ibanez M.D.            Relevant Problems   CARDIAC   (positive) NSTEMI (non-ST elevated myocardial infarction) (HCC)   (positive) Primary hypertension   (positive) Severe aortic stenosis       Physical Exam    Airway   Mallampati: II  TM distance: >3 FB  Neck ROM: full       Cardiovascular - normal exam  Rhythm: regular  Rate: normal  (-) murmur     Dental - normal exam           Pulmonary - normal exam  Breath sounds clear to auscultation     Abdominal    Neurological - normal exam                   Anesthesia Plan    ASA 4       Plan - general               Induction: intravenous    Postoperative Plan: Postoperative administration of opioids is intended.    Pertinent diagnostic labs and testing reviewed    Informed Consent:    Anesthetic plan and risks discussed with patient.    Use of blood products discussed with: patient whom consented to blood products.

## 2024-06-10 NOTE — PROGRESS NOTES
Pt arrived to unit via bed at 0900 from OR. Pt oriented to room, unit, and plan of care. Tele-monitor placed and monitor room notified. Pt instructed on bedrest orders for 4 hours. All questions answered at this time. Call light within reach; fall precautions in place.

## 2024-06-10 NOTE — PROGRESS NOTES
4 Eyes Skin Assessment Completed by CON Shipman and CON Mendez.    Head Blanching, Bruising, and Redness  Ears Redness and Blanching  Nose Redness and Blanching  Mouth WDL  Neck WDL  Breast/Chest Redness and Blanching    Shoulder Blades WDL  Spine Redness and Blanching  (R) Arm/Elbow/Hand Redness, Blanching, and Bruising  (L) Arm/Elbow/Hand Redness, Blanching, and Bruising  Abdomen Bruising    Groin Bruising and Incision    Scrotum/Coccyx/Buttocks Redness and Blanching  (R) Leg Redness, Blanching, Rash, Scab, and Swelling    (L) Leg Redness, Blanching, Rash, Scab, and Swelling    (R) Heel/Foot/Toe Redness and Blanching    (L) Heel/Foot/Toe Redness and Blanching            Devices In Places ECG, Blood Pressure Cuff, Pulse Ox, and Oxy Mask      Interventions In Place Sacral Mepilex, Pillows, and Low Air Loss Mattress    Possible Skin Injury No    Pictures Uploaded Into Epic Yes  Wound Consult Placed N/A  RN Wound Prevention Protocol Ordered Yes

## 2024-06-10 NOTE — ANESTHESIA TIME REPORT
Anesthesia Start and Stop Event Times       Date Time Event    6/10/2024 0732 Ready for Procedure     0738 Anesthesia Start     0857 Anesthesia Stop          Responsible Staff  06/10/24      Name Role Begin End    Harjeet Thakur M.D. Anesth 0738 0857          Overtime Reason:  no overtime (within assigned shift)    Comments:

## 2024-06-10 NOTE — ANESTHESIA POSTPROCEDURE EVALUATION
Patient: Kelly Ly    Procedure Summary       Date: 06/10/24 Room / Location: [place] [address] / SURGERY [facility]    Anesthesia Start: 0738 Anesthesia Stop: 0857    Procedures:       TRANSCATHETER AORTIC VALVE REPLACEMENT (Right: Groin)      ECHOCARDIOGRAM, TRANSESOPHAGEAL, INTRAOPERATIVE (Throat)      INSERTION, CARDIAC PACEMAKER, TEMPORARY (Right: Neck) Diagnosis: (SEVERE AORTIC STENOSIS)    Surgeons: Markie Ibanez M.D. Responsible Provider: Harjeet Thakur M.D.    Anesthesia Type: general ASA Status: 4            Final Anesthesia Type: general  Last vitals  BP   Blood Pressure : (!) 174/79    Temp   36.3 °C (97.3 °F)    Pulse   98   Resp   16    SpO2   94 %      Anesthesia Post Evaluation    Patient location during evaluation: PACU  Patient participation: complete - patient participated  Level of consciousness: awake and alert    Airway patency: patent  Anesthetic complications: no  Cardiovascular status: hemodynamically stable  Respiratory status: face mask    PONV: none          There were no known notable events for this encounter.     Nurse Pain Score: 0 (NPRS)

## 2024-06-10 NOTE — PROGRESS NOTES
Med Rec complete per PT  Allergies Reviewed    Pt reports not taking anticoagulant in the last 14 days

## 2024-06-10 NOTE — PROCEDURES
DATE OF PROCEDURE: 6/10/24    REFERRING PHYSICIAN:     PROCEDURES performed:  1. Transcatheter aortic valve replacement.  2. Insertion and removal of temporary pacer wire    PRE PROCEDURAL DIAGNOSIS:  1. Severe symptomatic aortic stenosis, NYHA III.    Operators:  CT Surgeon:   Interventional cardiologist: Dr. Ibanez    DESCRIPTION OF PROCEDURE:  After informed consent was signed by the   patient, the patient was brought into the operating room, was prepped and draped in   usual sterile manner.  Timeout was performed.   General anesthesia and   Transesophagealechocardiogram was provided by .    Primary Arterial access:  Right femoral artery was cannulated using modified Seldinger technique under ultrasound guidance, a 6 [place] Islander sheath was inserted.  2 Perclose sutures were placed, arteriotomy was serially dilated,14 Fr sheath was inserted in the femoral artery over a stiff Lunderquist wire.    Secondary arterial access:  Right radial artery was cannulated with 6 [place] Islander sheath.  A pigtail catheter was advanced through 6 [place] Islander sheath, aortic root angiogram was performed to determine coplanar view.    Venous Access:  Right internal jugular vein was cannulated with 6 [place] Islander sheath, a temporary pacer wire was advanced to RV apex in usual fashion.    Through Villagran self-expandable sheath, AL-1 catheter was advanced to aortic root, straight wire was used to cross the aortic valve, a stiff Amplatz wire was placed in the LV apex and AL-1 catheter was withdrawn. 23 mm Villagran Mihir 3 natalia was prepped in usual fashion, orientation was confirmed.  Sapient 3 valve was slowly advanced over a stiff Amplatz wire to aortic position.  Rapid pacing was achieved using temporary pacer wire, aortic root angiogram was performed and after confirming good positioning, valve was slowly deployed under fluoroscopic guidance.Post procedure echocardiogram showed no significant paravalvular leak, good valve  positioning.    The patient tolerated the procedure well. At the end of procedure, all pacemaker wire,   pigtail catheters and sheaths were removed.  The primary arterial access site was closed with the PreClose system.  The secondary arterial access was closed via vasc-band. The patient was then extubated and transferred to PACU in stable condition.    Complications: none  Specimens: none  Estimated blood loss: <50cc      IMPRESSION:  Successful transcatheter aortic valve replacement using 23 mm Villagran meron 3  Ultra valve    RECOMMENDATION:   Guideline directed medical therapy.    Markie Ibanez  Interventional cardiologist

## 2024-06-10 NOTE — CARE PLAN
The patient is Watcher - Medium risk of patient condition declining or worsening    Shift Goals  Clinical Goals: hemodynamic stability  Patient Goals: Get better  Family Goals: MARCOS    Progress made toward(s) clinical / shift goals:    Problem: Knowledge Deficit - Standard  Goal: Patient and family/care givers will demonstrate understanding of plan of care, disease process/condition, diagnostic tests and medications  Outcome: Progressing     Problem: Day of surgery post CABG/Heart valve replacement  Goal: Stabilization in immediate post op period  Outcome: Progressing     Problem: Hemodynamics  Goal: Patient's hemodynamics, fluid balance and neurologic status will be stable or improve  Outcome: Progressing     Problem: Respiratory  Goal: Patient will achieve/maintain optimum respiratory ventilation and gas exchange  Outcome: Progressing     Problem: Risk for Aspiration  Goal: Patient's risk for aspiration will be absent or decrease  Outcome: Progressing     Problem: Self Care  Goal: Patient will have the ability to perform ADLs independently or with assistance (bathe, groom, dress, toilet and feed)  Outcome: Progressing     Problem: Pain - Standard  Goal: Alleviation of pain or a reduction in pain to the patient’s comfort goal  Outcome: Progressing

## 2024-06-11 ENCOUNTER — APPOINTMENT (OUTPATIENT)
Dept: RADIOLOGY | Facility: MEDICAL CENTER | Age: 89
DRG: 267 | End: 2024-06-11
Attending: NURSE PRACTITIONER
Payer: MEDICARE

## 2024-06-11 ENCOUNTER — PHARMACY VISIT (OUTPATIENT)
Dept: PHARMACY | Facility: MEDICAL CENTER | Age: 89
End: 2024-06-11
Payer: COMMERCIAL

## 2024-06-11 VITALS
HEART RATE: 99 BPM | OXYGEN SATURATION: 94 % | DIASTOLIC BLOOD PRESSURE: 64 MMHG | SYSTOLIC BLOOD PRESSURE: 136 MMHG | RESPIRATION RATE: 22 BRPM | TEMPERATURE: 97.3 F | WEIGHT: 195.77 LBS | HEIGHT: 64 IN | BODY MASS INDEX: 33.42 KG/M2

## 2024-06-11 LAB
ACT BLD: 281 SEC (ref 74–137)
ALBUMIN SERPL BCP-MCNC: 3.5 G/DL (ref 3.2–4.9)
ALBUMIN/GLOB SERPL: 1.3 G/DL
ALP SERPL-CCNC: 70 U/L (ref 30–99)
ALT SERPL-CCNC: 8 U/L (ref 2–50)
ANION GAP SERPL CALC-SCNC: 12 MMOL/L (ref 7–16)
AST SERPL-CCNC: 17 U/L (ref 12–45)
BILIRUB SERPL-MCNC: 0.6 MG/DL (ref 0.1–1.5)
BUN SERPL-MCNC: 10 MG/DL (ref 8–22)
CALCIUM ALBUM COR SERPL-MCNC: 9.3 MG/DL (ref 8.5–10.5)
CALCIUM SERPL-MCNC: 8.9 MG/DL (ref 8.5–10.5)
CHLORIDE SERPL-SCNC: 105 MMOL/L (ref 96–112)
CHOLEST SERPL-MCNC: 147 MG/DL (ref 100–199)
CO2 SERPL-SCNC: 21 MMOL/L (ref 20–33)
CREAT SERPL-MCNC: 0.61 MG/DL (ref 0.5–1.4)
EKG IMPRESSION: NORMAL
ERYTHROCYTE [DISTWIDTH] IN BLOOD BY AUTOMATED COUNT: 44.2 FL (ref 35.9–50)
GFR SERPLBLD CREATININE-BSD FMLA CKD-EPI: 87 ML/MIN/1.73 M 2
GLOBULIN SER CALC-MCNC: 2.8 G/DL (ref 1.9–3.5)
GLUCOSE SERPL-MCNC: 129 MG/DL (ref 65–99)
HCT VFR BLD AUTO: 42.7 % (ref 37–47)
HDLC SERPL-MCNC: 73 MG/DL
HGB BLD-MCNC: 13.7 G/DL (ref 12–16)
LDLC SERPL CALC-MCNC: 52 MG/DL
MCH RBC QN AUTO: 28 PG (ref 27–33)
MCHC RBC AUTO-ENTMCNC: 32.1 G/DL (ref 32.2–35.5)
MCV RBC AUTO: 87.1 FL (ref 81.4–97.8)
NT-PROBNP SERPL IA-MCNC: 2495 PG/ML (ref 0–125)
PLATELET # BLD AUTO: 262 K/UL (ref 164–446)
PMV BLD AUTO: 9.5 FL (ref 9–12.9)
POTASSIUM SERPL-SCNC: 3.7 MMOL/L (ref 3.6–5.5)
PROT SERPL-MCNC: 6.3 G/DL (ref 6–8.2)
RBC # BLD AUTO: 4.9 M/UL (ref 4.2–5.4)
SODIUM SERPL-SCNC: 138 MMOL/L (ref 135–145)
TRIGL SERPL-MCNC: 111 MG/DL (ref 0–149)
WBC # BLD AUTO: 17.8 K/UL (ref 4.8–10.8)

## 2024-06-11 PROCEDURE — 80061 LIPID PANEL: CPT

## 2024-06-11 PROCEDURE — 700102 HCHG RX REV CODE 250 W/ 637 OVERRIDE(OP): Performed by: NURSE PRACTITIONER

## 2024-06-11 PROCEDURE — 93005 ELECTROCARDIOGRAM TRACING: CPT | Performed by: NURSE PRACTITIONER

## 2024-06-11 PROCEDURE — 97162 PT EVAL MOD COMPLEX 30 MIN: CPT

## 2024-06-11 PROCEDURE — RXMED WILLOW AMBULATORY MEDICATION CHARGE: Performed by: NURSE PRACTITIONER

## 2024-06-11 PROCEDURE — 93010 ELECTROCARDIOGRAM REPORT: CPT | Performed by: INTERNAL MEDICINE

## 2024-06-11 PROCEDURE — 80053 COMPREHEN METABOLIC PANEL: CPT

## 2024-06-11 PROCEDURE — 83880 ASSAY OF NATRIURETIC PEPTIDE: CPT

## 2024-06-11 PROCEDURE — A9270 NON-COVERED ITEM OR SERVICE: HCPCS | Performed by: NURSE PRACTITIONER

## 2024-06-11 PROCEDURE — 85027 COMPLETE CBC AUTOMATED: CPT

## 2024-06-11 PROCEDURE — 97535 SELF CARE MNGMENT TRAINING: CPT

## 2024-06-11 PROCEDURE — 71045 X-RAY EXAM CHEST 1 VIEW: CPT

## 2024-06-11 PROCEDURE — 700111 HCHG RX REV CODE 636 W/ 250 OVERRIDE (IP): Performed by: NURSE PRACTITIONER

## 2024-06-11 PROCEDURE — 99239 HOSP IP/OBS DSCHRG MGMT >30: CPT | Mod: FS | Performed by: INTERNAL MEDICINE

## 2024-06-11 RX ORDER — RAMIPRIL 5 MG/1
10 CAPSULE ORAL DAILY
Qty: 60 CAPSULE | Refills: 11 | Status: SHIPPED | OUTPATIENT
Start: 2024-06-12

## 2024-06-11 RX ORDER — ASPIRIN 81 MG/1
81 TABLET ORAL DAILY
Qty: 100 TABLET | Refills: 3 | Status: SHIPPED | OUTPATIENT
Start: 2024-06-12

## 2024-06-11 RX ADMIN — AMLODIPINE BESYLATE 10 MG: 10 TABLET ORAL at 05:05

## 2024-06-11 RX ADMIN — ASPIRIN 81 MG: 81 TABLET, COATED ORAL at 05:05

## 2024-06-11 RX ADMIN — RAMIPRIL 10 MG: 5 CAPSULE ORAL at 05:06

## 2024-06-11 RX ADMIN — HYDRALAZINE HYDROCHLORIDE 10 MG: 20 INJECTION INTRAMUSCULAR; INTRAVENOUS at 03:07

## 2024-06-11 ASSESSMENT — GAIT ASSESSMENTS
DEVIATION: NO DEVIATION
DISTANCE (FEET): 250
GAIT LEVEL OF ASSIST: SUPERVISED

## 2024-06-11 ASSESSMENT — COGNITIVE AND FUNCTIONAL STATUS - GENERAL
MOBILITY SCORE: 18
MOVING FROM LYING ON BACK TO SITTING ON SIDE OF FLAT BED: A LITTLE
STANDING UP FROM CHAIR USING ARMS: A LITTLE
CLIMB 3 TO 5 STEPS WITH RAILING: A LITTLE
MOVING TO AND FROM BED TO CHAIR: A LITTLE
WALKING IN HOSPITAL ROOM: A LITTLE
SUGGESTED CMS G CODE MODIFIER MOBILITY: CK
TURNING FROM BACK TO SIDE WHILE IN FLAT BAD: A LITTLE

## 2024-06-11 ASSESSMENT — FIBROSIS 4 INDEX: FIB4 SCORE: 1.97

## 2024-06-11 NOTE — CARE PLAN
The patient is Watcher - Medium risk of patient condition declining or worsening    Shift Goals  Clinical Goals: hemodynamically stable  Patient Goals: go home  Family Goals: elisabeth    Progress made toward(s) clinical / shift goals:      Problem: Knowledge Deficit - Standard  Goal: Patient and family/care givers will demonstrate understanding of plan of care, disease process/condition, diagnostic tests and medications  Outcome: Progressing     Problem: Day of surgery post CABG/Heart valve replacement  Goal: Stabilization in immediate post op period  Outcome: Progressing     Problem: Hemodynamics  Goal: Patient's hemodynamics, fluid balance and neurologic status will be stable or improve  Outcome: Progressing     Problem: Respiratory  Goal: Patient will achieve/maintain optimum respiratory ventilation and gas exchange  Outcome: Progressing     Problem: Risk for Aspiration  Goal: Patient's risk for aspiration will be absent or decrease  Outcome: Progressing     Problem: Nutrition - Advanced  Goal: Patient will display progressive weight gain toward goal have adequate food and fluid intake  Outcome: Progressing     Problem: Self Care  Goal: Patient will have the ability to perform ADLs independently or with assistance (bathe, groom, dress, toilet and feed)  Outcome: Progressing     Problem: Pain - Standard  Goal: Alleviation of pain or a reduction in pain to the patient’s comfort goal  Outcome: Progressing     Problem: Fall Risk  Goal: Patient will remain free from falls  Outcome: Progressing

## 2024-06-11 NOTE — PROGRESS NOTES
Patient is being discharged home from the discharge lounge. Patient is A&Ox4. IV removed. Discharge instructions provided to patient and reviewed. Patient verbalized understanding and all questions and concerns were addressed. Patient escorted to vehicle by discharge lounge staff.

## 2024-06-11 NOTE — DISCHARGE SUMMARY
PRIMARY DISCHARGE DIAGNOSIS: Status post transcatheter aortic valve replacement.    DISCHARGE DIAGNOSIS:  S/P TAVR    PROCEDURES/TESTIN. Successful transcatheter aortic valve replacement (TAVR) with #23 mm Villagran Mihir 3 ultra Resilia valve, transfemoral approach under general anesthesia on 6/10/24  2. Intraoperative transesophageal echocardiogram showing results pending  3. CXR on 24 showing   1.  Pulmonary edema and/or infiltrates, stable.  2.  Cardiomegaly  3.  Atherosclerosis  4. EKG on 24 showing SR rate of 90 bpm with first degree block  5. Labs   Lab Results   Component Value Date/Time    SODIUM 138 2024 01:05 AM    POTASSIUM 3.7 2024 01:05 AM    CHLORIDE 105 2024 01:05 AM    CO2 21 2024 01:05 AM    GLUCOSE 129 (H) 2024 01:05 AM    BUN 10 2024 01:05 AM    CREATININE 0.61 2024 01:05 AM       Lab Results   Component Value Date/Time    PROTHROMBTM 13.2 2024 03:00 AM    INR 0.99 2024 03:00 AM       Lab Results   Component Value Date/Time    WBC 17.8 (H) 2024 01:05 AM    RBC 4.90 2024 01:05 AM    HEMOGLOBIN 13.7 2024 01:05 AM    HEMATOCRIT 42.7 2024 01:05 AM    MCV 87.1 2024 01:05 AM    MCH 28.0 2024 01:05 AM    MCHC 32.1 (L) 2024 01:05 AM    MPV 9.5 2024 01:05 AM    NEUTSPOLYS 71.10 2024 03:00 AM    LYMPHOCYTES 16.80 (L) 2024 03:00 AM    MONOCYTES 9.30 2024 03:00 AM    EOSINOPHILS 1.90 2024 03:00 AM    BASOPHILS 0.50 2024 03:00 AM       HOSPITAL COURSE: The patient is a pleasant 88 year old female with severe symptomatic aortic stenosis, HLD with non-obstructive CAD, HTN, and renal mass (pending further work up). Due to the patient's symptoms, the patient underwent successful TAVR described as above. Post-procedure, the patient did well. They didn't require IV diuresis during their stay and will continue on oral diuretics post-operatively. She did have temporary  pacing wires which were removed after 24 hours with no signs of post-operative AV block. She will go home with a 7 day heart monitor shashao live for further outpatient observation. She will watch her BP at home and will call if further elevated >130/80. They were able to ambulate without difficulty. No further events were noted during their stay. They are now off oxygen and are to be discharged to her daughter's home in [place].    DISCHARGE MEDICATIONS:      Medication List        START taking these medications        Instructions   Aspirin Low Dose 81 MG EC tablet  Start taking on: June 12, 2024  Generic drug: aspirin   Take 1 Tablet by mouth every day.  Dose: 81 mg            CHANGE how you take these medications        Instructions   ramipril 5 MG Caps  Start taking on: June 12, 2024  What changed: how much to take  Commonly known as: Altace   Take 2 Capsules by mouth every day.  Dose: 10 mg            CONTINUE taking these medications        Instructions   amLODIPine 10 MG Tabs  Commonly known as: Norvasc   Take 10 mg by mouth every day.  Dose: 10 mg     latanoprost 0.005 % Soln  Commonly known as: Xalatan   Administer 1 Drop into the left eye every evening.  Dose: 1 Drop     rosuvastatin 20 MG Tabs  Commonly known as: Crestor   Take 1 Tablet by mouth every evening.  Dose: 20 mg            STOP taking these medications      metoprolol  MG Tb24  Commonly known as: Toprol XL            DISCHARGE INSTRUCTIONS: They are given discharge instructions on potential post-operative complications and symptoms to watch out for. Their groin sites were checked and were clean, dry, and intact. Patient or family to notify us for any complications noted on the discharge instructions. They will follow up with myself, Kristy Castillo DNP, APRN, on Tuesday in our cardiology office. They will not get labs before their follow up appointment. They will then follow up with a repeat echocardiogram in one month for post TAVR  assessment.      Future Appointments   Date Time Provider Department Center   6/18/2024  9:45 AM NIKOLAS [facility]ega None   7/8/2024 12:15 PM [facility]   7/18/2024  8:45 AM NIKOLAS Vega None   6/10/2025 12:15 PM [facility] EX[facility] 9 [facility]     Discharge time spent with patient was 45 minutes.

## 2024-06-11 NOTE — THERAPY
"Physical Therapy   Initial Evaluation     Patient Name: Kelly Ly  Age:  88 y.o., Sex:  female  Medical Record #: [id number]  Today's Date: 6/11/2024     Precautions  Precautions: Cardiac Precautions (See Comments)  Comments: S/P TAVR    Assessment  Patient is 88 y.o. female Severe symptomatic aortic stenosis, s/pTAVR, temporary pacemaker.   Hx of NSTEMI, HTN. Pt lives alone, very active on her property, mows lawn, walks dog.   Pt doing well with mobility, negative talk test, RPE 11 with amb, pt walks fast. Pt given \"Physical Activity After TAVR\" handout. Education included:  Home Walking program with exercise to begin with 3-5 minutes.   Progression of home walking program  Progression of aerobic tolerance and how to self monitor including the Talk Test and RPE scale  Outpatient Cardiac Rehab Phase 2  Signs and symptoms when to call 911  Pt receptive to education. Pt asked about doing strenuous work around her home. Educated pt to limit strenuous activities until cleared by MD. Pt motivated to DC. Eval only. Recommend Cardiac Rehab Phase II if available in her area.       Plan    Physical Therapy Initial Treatment Plan   Duration: Evaluation only    DC Equipment Recommendations: None  Discharge Recommendations:  (Cardiac Rehab Phase II)       Subjective    Pt agreed to PT. Got her pants and shoes on.      Objective       06/11/24 1032   Charge Group   PT Evaluation PT Evaluation Mod   PT Self Care / Home Evaluation (Units) 1   Total Time Spent   PT Total Time   (5390-7411)   PT Evaluation Time Spent (Mins) 15   PT Self Care/Home Evaluation Time Spent (Mins) 12   PT Total Time Spent (Calculated) 27   Initial Contact Note    Initial Contact Note Order Received and Verified, Evaluation Only - Patient Does Not Require Further Acute Physical Therapy at this Time.  However, May Benefit from Post Acute Therapy for Higher Level Functional Deficits.   Precautions   Precautions Cardiac Precautions (See Comments) "   Comments S/P TAVR   Vitals   Pulse   ()   Blood Pressure    (134/62 sitting, 139/60 after amb while sitting)   O2 Delivery Device None - Room Air   Vitals Comments no S/S distress, RPE with walking 11, negative talk test   Pain 0 - 10 Group   Therapist Pain Assessment 0   Prior Living Situation   Prior Services Home-Independent   Housing / Facility 1 Story House   Steps Into Home 3   Steps In Home 0   Equipment Owned None   Lives with - Patient's Self Care Capacity Alone and Able to Care For Self   Comments going to stay with her daughter in town until her follow up appointment   Prior Level of Functional Mobility   Comments walks dogs daily up to 30 minutes   Cognition    Cognition / Consciousness WDL   Level of Consciousness Alert   Active ROM Lower Body    Active ROM Lower Body  WDL   Strength Lower Body   Lower Body Strength  WDL   Balance Assessment   Sitting Balance (Static) Good   Sitting Balance (Dynamic) Good   Standing Balance (Static) Good   Standing Balance (Dynamic) Good   Weight Shift Sitting Good   Weight Shift Standing Good   Comments no AD   Gait Analysis   Gait Level Of Assist Supervised   Assistive Device None   Distance (Feet) 250   # of Times Distance was Traveled 2   Deviation No deviation   Functional Mobility   Sit to Stand Supervised   Bed, Chair, Wheelchair Transfer Supervised   Transfer Method Stand Step   6 Clicks Assessment - How much HELP from from another person do you currently need... (If the patient hasn't done an activity recently, how much help from another person do you think he/she would need if he/she tried?)   Turning from your back to your side while in a flat bed without using bedrails? 3   Moving from lying on your back to sitting on the side of a flat bed without using bedrails? 3   Moving to and from a bed to a chair (including a wheelchair)? 3   Standing up from a chair using your arms (e.g., wheelchair, or bedside chair)? 3   Walking in hospital room? 3    Climbing 3-5 steps with a railing? 3   6 clicks Mobility Score 18   Activity Tolerance   Comments limited by HR to 121, no symptoms   Education Group   Education Provided Cardiac Precautions;Role of Physical Therapist   Cardiac Precautions Patient Response Patient;Acceptance;Explanation;Handout;Teach Back;Action Demonstration;Verbal Demonstration   Role of Physical Therapist Patient Response Patient;Acceptance;Explanation;Verbal Demonstration   Physical Therapy Initial Treatment Plan    Duration Evaluation only   Anticipated Discharge Equipment and Recommendations   DC Equipment Recommendations None   Discharge Recommendations   (Cardiac Rehab Phase II)   Interdisciplinary Plan of Care Collaboration   IDT Collaboration with  Nursing   Patient Position at End of Therapy Edge of Bed;Call Light within Reach;Tray Table within Reach;Phone within Reach   Collaboration Comments Rn updated   Session Information   Date / Session Number  6/11 eval only

## 2024-06-11 NOTE — DISCHARGE INSTRUCTIONS
Wear compression socks for the whole day and remove at night. Make sure there are no folds that can cause wounds on the leg. Check skin everyday.

## 2024-06-13 LAB — LV EJECT FRACT  99904: 55

## 2024-06-18 ENCOUNTER — OFFICE VISIT (OUTPATIENT)
Dept: CARDIOLOGY | Facility: MEDICAL CENTER | Age: 89
End: 2024-06-18
Attending: NURSE PRACTITIONER
Payer: MEDICARE

## 2024-06-18 VITALS
HEART RATE: 95 BPM | WEIGHT: 196 LBS | RESPIRATION RATE: 14 BRPM | BODY MASS INDEX: 32.65 KG/M2 | OXYGEN SATURATION: 93 % | SYSTOLIC BLOOD PRESSURE: 182 MMHG | DIASTOLIC BLOOD PRESSURE: 70 MMHG | HEIGHT: 65 IN

## 2024-06-18 DIAGNOSIS — I25.10 CORONARY ARTERY DISEASE INVOLVING NATIVE CORONARY ARTERY OF NATIVE HEART WITHOUT ANGINA PECTORIS: ICD-10-CM

## 2024-06-18 DIAGNOSIS — Z95.2 S/P TAVR (TRANSCATHETER AORTIC VALVE REPLACEMENT): ICD-10-CM

## 2024-06-18 DIAGNOSIS — E78.5 HYPERLIPIDEMIA, UNSPECIFIED HYPERLIPIDEMIA TYPE: ICD-10-CM

## 2024-06-18 DIAGNOSIS — I10 PRIMARY HYPERTENSION: ICD-10-CM

## 2024-06-18 LAB — EKG IMPRESSION: NORMAL

## 2024-06-18 PROCEDURE — 93005 ELECTROCARDIOGRAM TRACING: CPT | Performed by: NURSE PRACTITIONER

## 2024-06-18 PROCEDURE — 99214 OFFICE O/P EST MOD 30 MIN: CPT | Performed by: NURSE PRACTITIONER

## 2024-06-18 PROCEDURE — 3077F SYST BP >= 140 MM HG: CPT | Performed by: NURSE PRACTITIONER

## 2024-06-18 PROCEDURE — 93010 ELECTROCARDIOGRAM REPORT: CPT | Performed by: STUDENT IN AN ORGANIZED HEALTH CARE EDUCATION/TRAINING PROGRAM

## 2024-06-18 PROCEDURE — 3078F DIAST BP <80 MM HG: CPT | Performed by: NURSE PRACTITIONER

## 2024-06-18 PROCEDURE — 99211 OFF/OP EST MAY X REQ PHY/QHP: CPT | Performed by: NURSE PRACTITIONER

## 2024-06-18 ASSESSMENT — ENCOUNTER SYMPTOMS
CLAUDICATION: 0
FEVER: 0
PND: 0
DIZZINESS: 0
PALPITATIONS: 0
ABDOMINAL PAIN: 0
MYALGIAS: 0
COUGH: 0
ORTHOPNEA: 0
SHORTNESS OF BREATH: 0

## 2024-06-18 ASSESSMENT — FIBROSIS 4 INDEX: FIB4 SCORE: 1.97

## 2024-06-18 NOTE — PROGRESS NOTES
Chief Complaint   Patient presents with    Other     F/v DX: 1 week post TAVR.    Hypertension     Subjective     Kelly Ly is a 88 y.o. female who presents today for 1 week follow up.    Hx of S/P TAVR, obesity with HLD with non-obstructive CAD, HTN, and renal mass (pending further work up).    She presents today alone. She was living with her daughter this week, she goes back to her home in [place], [place] tomorrow.    Her heart monitor was removed today. She has no symptoms of concern other than bug bites all over her legs, possibly bed bugs from her daughter's home.    She has no chest pain, shortness of breath, edema, dizziness/lightheadedness, or palpitations.    History reviewed. No pertinent past medical history.  Past Surgical History:   Procedure Laterality Date    SC ECHO MARQUEZ TAVR TMVR LAAC W WO CONT N/A 6/10/2024    Procedure: ECHOCARDIOGRAM, TRANSESOPHAGEAL, INTRAOPERATIVE;  Surgeon: Markie Ibanez M.D.;  Location: SURGERY [facility];  Service: Cardiac    TRANSCATHETER AORTIC VALVE REPLACEMENT Right 6/10/2024    Procedure: TRANSCATHETER AORTIC VALVE REPLACEMENT;  Surgeon: Markie Ibanez M.D.;  Location: SURGERY [facility];  Service: Cardiac    PACEMAKER INSERTION Right 6/10/2024    Procedure: INSERTION, CARDIAC PACEMAKER, TEMPORARY;  Surgeon: Markie Ibanez M.D.;  Location: SURGERY [facility];  Service: Cardiac     History reviewed. No pertinent family history.  Social History     Socioeconomic History    Marital status: Unknown     Spouse name: Not on file    Number of children: Not on file    Years of education: Not on file    Highest education level: Not on file   Occupational History    Not on file   Tobacco Use    Smoking status: Never    Smokeless tobacco: Never   Substance and Sexual Activity    Alcohol use: Not on file    Drug use: Not on file    Sexual activity: Not on file   Other Topics Concern    Not on file   Social History Narrative    Not on file     Social  Determinants of Health     Financial Resource Strain: Low Risk  (4/16/2024)    Received from [facility]    Financial Resource Strain     Difficulty of Paying Living Expenses: Not on file     Access to Reliable Phone: Not on file   Food Insecurity: No Food Insecurity (6/2/2024)    Hunger Vital Sign     Worried About Running Out of Food in the Last Year: Never true     Ran Out of Food in the Last Year: Never true   Transportation Needs: No Transportation Needs (6/2/2024)    PRAPARE - Transportation     Lack of Transportation (Medical): No     Lack of Transportation (Non-Medical): No   Physical Activity: Not on file   Stress: Not on file   Social Connections: Not on file   Intimate Partner Violence: Not At Risk (6/2/2024)    Humiliation, Afraid, Rape, and Kick questionnaire     Fear of Current or Ex-Partner: No     Emotionally Abused: No     Physically Abused: No     Sexually Abused: No   Housing Stability: Low Risk  (6/2/2024)    Housing Stability Vital Sign     Unable to Pay for Housing in the Last Year: No     Number of Places Lived in the Last Year: 1     Unstable Housing in the Last Year: No     Allergies   Allergen Reactions    Codeine Unspecified     FAINTING     Outpatient Encounter Medications as of 6/18/2024   Medication Sig Dispense Refill    ramipril (ALTACE) 5 MG Cap Take 2 Capsules by mouth every day. (Patient taking differently: Take 10 mg by mouth every day. Takes three capsules) 60 Capsule 11    aspirin 81 MG EC tablet Take 1 Tablet by mouth every day. 100 Tablet 3    amLODIPine (NORVASC) 10 MG Tab Take 10 mg by mouth every day.      latanoprost (XALATAN) 0.005 % Solution Administer 1 Drop into the left eye every evening.      rosuvastatin (CRESTOR) 20 MG Tab Take 1 Tablet by mouth every evening. 30 Tablet 0     No facility-administered encounter medications on file as of 6/18/2024.     Review of Systems   Constitutional:  Negative for fever and malaise/fatigue.   Respiratory:  Negative for  "cough and shortness of breath.    Cardiovascular:  Negative for chest pain, palpitations, orthopnea, claudication, leg swelling and PND.   Gastrointestinal:  Negative for abdominal pain.   Musculoskeletal:  Negative for myalgias.   Skin:         Bug bites all over legs   Neurological:  Negative for dizziness.              Objective     BP (!) 182/70 (BP Location: Left arm, Patient Position: Sitting, BP Cuff Size: Adult)   Pulse 95   Resp 14   Ht 1.651 m (5' 5\")   Wt 88.9 kg (196 lb)   SpO2 93%   BMI 32.62 kg/m²     Physical Exam  Vitals and nursing note reviewed.   Constitutional:       Appearance: Normal appearance. She is well-developed. She is obese.   HENT:      Head: Normocephalic and atraumatic.   Neck:      Vascular: No JVD.   Cardiovascular:      Rate and Rhythm: Normal rate and regular rhythm.      Pulses: Normal pulses.      Heart sounds: Normal heart sounds.   Pulmonary:      Effort: Pulmonary effort is normal.      Breath sounds: Normal breath sounds.   Musculoskeletal:         General: Normal range of motion.   Skin:     General: Skin is warm and dry.      Capillary Refill: Capillary refill takes less than 2 seconds.   Neurological:      General: No focal deficit present.      Mental Status: She is alert and oriented to person, place, and time. Mental status is at baseline.   Psychiatric:         Mood and Affect: Mood normal.         Behavior: Behavior normal.         Thought Content: Thought content normal.         Judgment: Judgment normal.                Assessment & Plan     1. Primary hypertension  EKG      2. Coronary artery disease involving native coronary artery of native heart without angina pectoris        3. Hyperlipidemia, unspecified hyperlipidemia type        4. S/P TAVR (transcatheter aortic valve replacement)          Medical Decision Making: Today's Assessment/Status/Plan:      1. S/P TAVR, NYHA II  -doing well post-op  -cont asa lifelong  -groins CDI with mild bruising and small " nodule   -SBE prophylaxis understands  -echo 1 month with structural heart follow up  -reviewed hospital imaging, labs, and EKG  -cardiac rehab referral placed but unable to attend due to living 6 hours away  -EKG shows SR with first degree block, zio monitor removed today for review    2. HLD with CAD with NSTEMI  -no angina or STEWART  -cont asa, statin lifelong  -LDL goal <70 with CAD    3. HTN  -uncontrolled, she states she anxious  -follow up in 1 week with home readings  -cont amlodipine and ramipril  -consider increase in medications  -BP goal <130/80 consistently    Patient is to follow up with Kristy MERRITT in 1 month with echo; 1 week BP follow up by phone.

## 2024-06-18 NOTE — PATIENT INSTRUCTIONS
Watch BP at home, BP goal 110-130/60-80.    I will call you in 1 week to check on your BP readings.    Follow up with your PCP in 1-3 months is fine.    You will see us back here in [place] in 1 month with echocardiogram and then 1 year with echocardiogram.

## 2024-06-27 ENCOUNTER — TELEPHONE (OUTPATIENT)
Dept: CARDIOLOGY | Facility: MEDICAL CENTER | Age: 89
End: 2024-06-27
Payer: MEDICARE

## 2024-06-27 DIAGNOSIS — Z95.2 S/P TAVR (TRANSCATHETER AORTIC VALVE REPLACEMENT): ICD-10-CM

## 2024-06-27 DIAGNOSIS — I45.10 RBBB: ICD-10-CM

## 2024-06-27 NOTE — TELEPHONE ENCOUNTER
Sergio EOS to SC's nurse, Lori, on 6/27/2024    Monitor analysis time: 2 days 22 hours    Preliminary findings:    Sinus Rhythm from  with an avg rate of 87 bpm    Supraventricular ectopy: rare    Ventricular ectopy: rare, Bigeminy and trigeminy were present    Patient recorded 1 event during monitoring which corresponded to:

## 2024-06-27 NOTE — TELEPHONE ENCOUNTER
To BE: Please review and sign cardiac monitor, thank you!      To SC: Monitor for review, thank you!

## 2024-07-08 ENCOUNTER — APPOINTMENT (OUTPATIENT)
Dept: CARDIOLOGY | Facility: MEDICAL CENTER | Age: 89
End: 2024-07-08
Attending: INTERNAL MEDICINE
Payer: MEDICARE

## 2024-07-16 ENCOUNTER — TELEPHONE (OUTPATIENT)
Dept: CARDIOLOGY | Facility: MEDICAL CENTER | Age: 89
End: 2024-07-16
Payer: MEDICARE

## 2024-07-18 ENCOUNTER — APPOINTMENT (OUTPATIENT)
Dept: CARDIOLOGY | Facility: MEDICAL CENTER | Age: 89
End: 2024-07-18
Attending: NURSE PRACTITIONER
Payer: MEDICARE

## 2024-07-18 DIAGNOSIS — Z95.2 S/P TAVR (TRANSCATHETER AORTIC VALVE REPLACEMENT): ICD-10-CM

## 2024-07-18 DIAGNOSIS — I25.10 CORONARY ARTERY DISEASE INVOLVING NATIVE CORONARY ARTERY OF NATIVE HEART WITHOUT ANGINA PECTORIS: ICD-10-CM

## 2024-07-18 DIAGNOSIS — E78.5 HYPERLIPIDEMIA, UNSPECIFIED HYPERLIPIDEMIA TYPE: ICD-10-CM

## 2024-07-18 DIAGNOSIS — I10 PRIMARY HYPERTENSION: ICD-10-CM

## 2024-08-05 NOTE — OR SURGEON
OPERATIVE REPORT    Operation date: 6/10/2024    PreOp Diagnosis: Severe symptomatic aortic stenosis    PostOp Diagnosis: Severe symptomatic aortic stenosis    Procedure(s):  TRANSCATHETER AORTIC VALVE REPLACEMENT (TAVR 23 mm S3 Ultra Resilia Villagran bovine pericardial valve) and intraoperative transesophageal echocardiography    Surgeon(s):  OMERO Go M.D.    Anesthesiologist/Type of Anesthesia:  Anesthesiologist: Harjeet Thakur M.D.  Perfusionist: Doug Lemus/General    Surgical Staff:  Circulator: Kirk Alcantar R.N.; Dominguez Peterson R.N.  Scrub Person: Laury Cabezas  Radiology Technologist: Grisell de La Rosa Marquez; Elver Sierra    Specimens removed if any: None    Estimated Blood Loss: Minimal    Findings: Severe aortic stenosis    Complications: None    Indications:  Ms. Ly is an 88-year-old female with severe symptomatic aortic stenosis.    Procedure:  The patient was brought to the operating room and placed on the operating room table in the supine position.  After successful induction of general anesthesia endotracheal intubation, the patient was prepped and draped in the usual sterile fashion.  A right internal jugular temporary transvenous pacemaker was placed by the anesthesiologist, Harjeet Thakur MD.  Ultrasound-guided right radial arterial and right femoral arterial access was obtained.  A 1 cm incision was made in the right groin and 2 Perclose sutures were deployed.  A pigtail catheter was placed in the right coronary sinus and the deployment angle was determined.  A 14 Syrian eSheath was then placed in the right common femoral artery and its tip was positioned in the abdominal aorta.  The aortic valve was crossed with a wire.  A deployment catheter with a 23 mm S3 Ultra Resilia Villagran bovine pericardial valve at its tip was positioned across the aortic annulus.  The implantation balloon was inflated to a peak pressure of 6.5 win.   Wires and catheters were removed.  Intraoperative transesophageal echocardiography did not show any paravalvular or central leaks.  The right femoral eSheath was removed and the Perclose sutures were tightened down.  Dermabond was applied over the small right groin incision.  The remainder of the operation will be dictated by Dr. Ibanez.  There were no apparent complications.  The patient tolerated the procedure well and left the operating room and in stable condition.      6/10/2024 8:45 AM Monica Pak MD, FACS         3 = A little assistance

## 2025-03-17 ENCOUNTER — TELEPHONE (OUTPATIENT)
Dept: CARDIOLOGY | Facility: MEDICAL CENTER | Age: 89
End: 2025-03-17
Payer: MEDICARE

## 2025-03-17 NOTE — TELEPHONE ENCOUNTER
Called patient to schedule KRISTY Castillo appt for her 1yr TAVR f/v but she did not want to schedule an appt at this time she got told by her local cardiologist that the valve was working right, and she also req we cancel her Echo because she already had one this month.

## 2025-04-15 ENCOUNTER — DOCUMENTATION (OUTPATIENT)
Dept: CARDIOLOGY | Facility: MEDICAL CENTER | Age: 89
End: 2025-04-15
Payer: MEDICARE

## 2025-04-15 NOTE — PROGRESS NOTES
Valve Program Functional Assessment:     [id number]   1a) Showering/bathin  1b) Walking 1 block on ground: 5  1c) Hurrying or joggin  2) Swellin  3) Fatigue: 3  4) Shortness of breath: 5  5) Sleep sitting up: 5  6) Limited enjoyment of life: 5  7) Spend the rest of your life with HF: 4  8a) Hobbies, recreational activities:6  8b) Working or doing household chores:6  8c) Visiting family or friends: 6

## (undated) DEVICE — ELECTRODE DUAL RETURN W/ CORD - (50/PK)

## (undated) DEVICE — DRAPE CLEAR W/ELASTIC BAND RAD CARM 40 X40" (20EA/CA)"

## (undated) DEVICE — SENSOR OXIMETER ADULT SPO2 RD SET (20EA/BX)

## (undated) DEVICE — TOWELS CLOTH SURGICAL - (4/PK 20PK/CA)

## (undated) DEVICE — DEVICE INFLATION ATRION NOVALFEX TRANSFEMORAL SYSTEM (1EA)

## (undated) DEVICE — COVER FOOT UNIVERSAL DISP. - (25EA/CA)

## (undated) DEVICE — CRIMPER CATHETER EDWARDS DISPOSABLE (1EA)

## (undated) DEVICE — COVER LIGHT HANDLE ALC PLUS DISP (18EA/BX)

## (undated) DEVICE — ELECTRODE RADIOLUCNT SOLID GEL DEFIB PADS (12EA/CA)

## (undated) DEVICE — SET EXTENSION WITH 2 PORTS (48EA/CA) ***PART #2C8610 IS A SUBSTITUTE*****

## (undated) DEVICE — Device

## (undated) DEVICE — SUTURE  0 ETHIBOND CT-1 30 IN (36PK/BX)

## (undated) DEVICE — GLOVESZ 8.5 BIOGEL PI MICRO - PF LF (50PR/BX)

## (undated) DEVICE — SUTURE DEVICE CLOSURE REPAIR SYSTEM PERCLOSE PROSTYLE (10EA/PK)

## (undated) DEVICE — ARM BAND RADIAL TR BAND (5EA/BX)

## (undated) DEVICE — DECANTER FLD BLS - (50/CA)

## (undated) DEVICE — CATHETER PIGTAIL 6FR 145 (5EA/BX)

## (undated) DEVICE — INTRODUCER CATHETER  DILATOR PROTRUDING 8FR 2.5CM (10EA/BX)

## (undated) DEVICE — WIRE GUIDE LUNDQST.035X180 - TSMG-35-180-4-LES ORDER BY BOX (5EA/BX)

## (undated) DEVICE — TUBING CLEARLINK DUO-VENT - C-FLO (48EA/CA)

## (undated) DEVICE — PACK TAVR (3EA/CA)

## (undated) DEVICE — KIT RETROFIT PROBE COVERS (24EA/EA)

## (undated) DEVICE — LACTATED RINGERS INJ 1000 ML - (14EA/CA 60CA/PF)

## (undated) DEVICE — CANISTER SUCTION 3000ML MECHANICAL FILTER AUTO SHUTOFF MEDI-VAC NONSTERILE LF DISP  (40EA/CA)

## (undated) DEVICE — BLADE SURGICAL #11 - (50/BX)

## (undated) DEVICE — SHEATH DESTINATION WITH DILATOR 6FR 45CM

## (undated) DEVICE — SUCTION INSTRUMENT YANKAUER BULBOUS TIP W/O VENT (50EA/CA)

## (undated) DEVICE — GUIDEWIRE STARTER STRAIGHT FIXED CORE .035 150CM 4 STRAIGHT PTFE/HEPARIN COATED (10/BX)

## (undated) DEVICE — SYR ANGIO CNRST INJ HI-PRS 3W 65 - (10EA/CA)"

## (undated) DEVICE — DRAPE MAYO STAND - (30/CA)

## (undated) DEVICE — INTRODUCER SHEATH 6FR 2.5CM - DILATOR PROTRUDING (10/BX)

## (undated) DEVICE — SYSTEM DELIVERY COMMANDER TAVR KIT 23MM COMPONENT (1EA)

## (undated) DEVICE — CABLE TEMPORARY PACING

## (undated) DEVICE — COVER LIGHT HANDLE FLEXIBLE - SOFT (2EA/PK 80PK/CA)

## (undated) DEVICE — STOPCOCK IV 400 PSI 3W ROT (50EA/BX)

## (undated) DEVICE — CATHETER 6FR AL1 100CM (5/BX)

## (undated) DEVICE — WIRE GUIDE AES .035 260CM WITH 3MM J TIP"

## (undated) DEVICE — CHLORAPREP 26 ML APPLICATOR - ORANGE TINT(25/CA)

## (undated) DEVICE — IV TUBING HI-FLO RATE W/CLAMP (50/CA)

## (undated) DEVICE — GLIDESHEATH SLENDER NITINOL KIT .021 GW 6FR 10CM SINGLE WALL